# Patient Record
Sex: MALE | Race: WHITE | HISPANIC OR LATINO | Employment: FULL TIME | URBAN - METROPOLITAN AREA
[De-identification: names, ages, dates, MRNs, and addresses within clinical notes are randomized per-mention and may not be internally consistent; named-entity substitution may affect disease eponyms.]

---

## 2023-04-25 ENCOUNTER — TRANSCRIBE ORDERS (OUTPATIENT)
Dept: PREADMISSION TESTING | Facility: HOSPITAL | Age: 63
End: 2023-04-25
Payer: MEDICAID

## 2023-04-26 ENCOUNTER — TRANSCRIBE ORDERS (OUTPATIENT)
Dept: LAB | Facility: HOSPITAL | Age: 63
End: 2023-04-26
Payer: MEDICAID

## 2023-04-26 ENCOUNTER — PRE-ADMISSION TESTING (OUTPATIENT)
Dept: PREADMISSION TESTING | Facility: HOSPITAL | Age: 63
End: 2023-04-26
Payer: MEDICAID

## 2023-04-26 ENCOUNTER — LAB (OUTPATIENT)
Dept: LAB | Facility: HOSPITAL | Age: 63
End: 2023-04-26
Payer: MEDICAID

## 2023-04-26 DIAGNOSIS — Z87.898 PERSONAL HISTORY OF OTHER LYMPHATIC AND HEMATOPOIETIC NEOPLASM: Primary | ICD-10-CM

## 2023-04-26 DIAGNOSIS — Z01.818 OTHER SPECIFIED PRE-OPERATIVE EXAMINATION: ICD-10-CM

## 2023-04-26 DIAGNOSIS — R73.09 IMPAIRED GLUCOSE TOLERANCE TEST: ICD-10-CM

## 2023-04-26 LAB
ALBUMIN SERPL-MCNC: 4.3 G/DL (ref 3.5–5.2)
ALBUMIN/GLOB SERPL: 1.6 G/DL
ALP SERPL-CCNC: 54 U/L (ref 39–117)
ALT SERPL W P-5'-P-CCNC: 23 U/L (ref 1–41)
ANION GAP SERPL CALCULATED.3IONS-SCNC: 11 MMOL/L (ref 5–15)
AST SERPL-CCNC: 20 U/L (ref 1–40)
BILIRUB SERPL-MCNC: 0.2 MG/DL (ref 0–1.2)
BUN SERPL-MCNC: 15 MG/DL (ref 8–23)
BUN/CREAT SERPL: 22.1 (ref 7–25)
CALCIUM SPEC-SCNC: 9 MG/DL (ref 8.6–10.5)
CHLORIDE SERPL-SCNC: 109 MMOL/L (ref 98–107)
CO2 SERPL-SCNC: 22 MMOL/L (ref 22–29)
CREAT SERPL-MCNC: 0.68 MG/DL (ref 0.76–1.27)
DEPRECATED RDW RBC AUTO: 45.6 FL (ref 37–54)
EGFRCR SERPLBLD CKD-EPI 2021: 106.4 ML/MIN/1.73
ERYTHROCYTE [DISTWIDTH] IN BLOOD BY AUTOMATED COUNT: 13 % (ref 12.3–15.4)
GLOBULIN UR ELPH-MCNC: 2.7 GM/DL
GLUCOSE SERPL-MCNC: 100 MG/DL (ref 65–99)
HBA1C MFR BLD: 5.5 % (ref 4.8–5.6)
HCT VFR BLD AUTO: 39.6 % (ref 37.5–51)
HGB BLD-MCNC: 13.1 G/DL (ref 13–17.7)
MCH RBC QN AUTO: 31.5 PG (ref 26.6–33)
MCHC RBC AUTO-ENTMCNC: 33.1 G/DL (ref 31.5–35.7)
MCV RBC AUTO: 95.2 FL (ref 79–97)
PLATELET # BLD AUTO: 200 10*3/MM3 (ref 140–450)
PMV BLD AUTO: 10.9 FL (ref 6–12)
POTASSIUM SERPL-SCNC: 4 MMOL/L (ref 3.5–5.2)
PROT SERPL-MCNC: 7 G/DL (ref 6–8.5)
RBC # BLD AUTO: 4.16 10*6/MM3 (ref 4.14–5.8)
SODIUM SERPL-SCNC: 142 MMOL/L (ref 136–145)
WBC NRBC COR # BLD: 12.63 10*3/MM3 (ref 3.4–10.8)

## 2023-04-26 PROCEDURE — 80053 COMPREHEN METABOLIC PANEL: CPT | Performed by: ORTHOPAEDIC SURGERY

## 2023-04-26 PROCEDURE — 93005 ELECTROCARDIOGRAM TRACING: CPT

## 2023-04-26 PROCEDURE — 36415 COLL VENOUS BLD VENIPUNCTURE: CPT | Performed by: ORTHOPAEDIC SURGERY

## 2023-04-26 PROCEDURE — 83036 HEMOGLOBIN GLYCOSYLATED A1C: CPT | Performed by: ORTHOPAEDIC SURGERY

## 2023-04-26 PROCEDURE — 85027 COMPLETE CBC AUTOMATED: CPT | Performed by: ORTHOPAEDIC SURGERY

## 2023-04-28 LAB
QT INTERVAL: 414 MS
QTC INTERVAL: 416 MS

## 2024-06-03 ENCOUNTER — PRE-ADMISSION TESTING (OUTPATIENT)
Dept: PREADMISSION TESTING | Facility: HOSPITAL | Age: 64
End: 2024-06-03
Payer: COMMERCIAL

## 2024-06-03 VITALS — HEIGHT: 66 IN | WEIGHT: 196.21 LBS | BODY MASS INDEX: 31.53 KG/M2

## 2024-06-03 LAB
ANION GAP SERPL CALCULATED.3IONS-SCNC: 8 MMOL/L (ref 5–15)
BUN SERPL-MCNC: 13 MG/DL (ref 8–23)
BUN/CREAT SERPL: 17.8 (ref 7–25)
CALCIUM SPEC-SCNC: 9 MG/DL (ref 8.6–10.5)
CHLORIDE SERPL-SCNC: 106 MMOL/L (ref 98–107)
CO2 SERPL-SCNC: 27 MMOL/L (ref 22–29)
CREAT SERPL-MCNC: 0.73 MG/DL (ref 0.76–1.27)
DEPRECATED RDW RBC AUTO: 46.2 FL (ref 37–54)
EGFRCR SERPLBLD CKD-EPI 2021: 103.5 ML/MIN/1.73
ERYTHROCYTE [DISTWIDTH] IN BLOOD BY AUTOMATED COUNT: 12.9 % (ref 12.3–15.4)
GLUCOSE SERPL-MCNC: 98 MG/DL (ref 65–99)
HBA1C MFR BLD: 5.5 % (ref 4.8–5.6)
HCT VFR BLD AUTO: 40.9 % (ref 37.5–51)
HGB BLD-MCNC: 13.5 G/DL (ref 13–17.7)
MCH RBC QN AUTO: 32 PG (ref 26.6–33)
MCHC RBC AUTO-ENTMCNC: 33 G/DL (ref 31.5–35.7)
MCV RBC AUTO: 96.9 FL (ref 79–97)
PLATELET # BLD AUTO: 193 10*3/MM3 (ref 140–450)
PMV BLD AUTO: 11.4 FL (ref 6–12)
POTASSIUM SERPL-SCNC: 4.2 MMOL/L (ref 3.5–5.2)
QT INTERVAL: 410 MS
QTC INTERVAL: 402 MS
RBC # BLD AUTO: 4.22 10*6/MM3 (ref 4.14–5.8)
SODIUM SERPL-SCNC: 141 MMOL/L (ref 136–145)
WBC NRBC COR # BLD AUTO: 10.85 10*3/MM3 (ref 3.4–10.8)

## 2024-06-03 PROCEDURE — 93005 ELECTROCARDIOGRAM TRACING: CPT

## 2024-06-03 PROCEDURE — 93010 ELECTROCARDIOGRAM REPORT: CPT | Performed by: INTERNAL MEDICINE

## 2024-06-03 PROCEDURE — 80048 BASIC METABOLIC PNL TOTAL CA: CPT

## 2024-06-03 PROCEDURE — 85027 COMPLETE CBC AUTOMATED: CPT

## 2024-06-03 PROCEDURE — 36415 COLL VENOUS BLD VENIPUNCTURE: CPT

## 2024-06-03 PROCEDURE — 83036 HEMOGLOBIN GLYCOSYLATED A1C: CPT

## 2024-06-03 NOTE — PAT
An arrival time for procedure was not provided during PAT visit. If patient had any questions or concerns about their arrival time, they were instructed to contact their surgeon/physician.  Additionally, if the patient referred to an arrival time that was acquired from their my chart account, patient was encouraged to verify that time with their surgeon/physician. Arrival times are NOT provided in Pre Admission Testing Department.    Patient instructed to drink 20 ounces of Gatorade or Gatorlyte (if diabetic) and it needs to be completed 1 hour (for Main OR patients) or 2 hours (scheduled  section & [facility] patients) before given arrival time for procedure (NO RED Gatorade and NO Gatorade Zero).    Patient verbalized understanding.    Discussed with patient options for receiving total joint replacement education and assessed patient's ability and preference. Joint Replacement Guide given to patient during PAT visit since not received a copy within the last year. Encouraged patient/family to read guide thoroughly and notify PAT staff with any questions or concerns. Handout provided directing patient to links to watch online videos related to joint replacement surgery on the [facility] website. The handout gives detailed instructions for joining an online joint replacement class through Zoom or phone conference offered on . Patient agreed to participate by watching videos online. Patient verbalized understanding of instructions and to complete the online learning tool survey. Encouraged to share information with family and/or . An overview of the joint replacement education was provided during the visit including general perioperative instructions that are routine for all surgical patients (PAT PASS, wipes, directions to pre-op, etc.).    Post-Surgery Information Instruction Sheet given to patient during Pre-Admission Testing Visit with verbal instructions to patient to return with PAT PASS on  the day of surgery. Additionally, encouraged patient to review the information provided.    InfuBLOCK (by [employer]) pain pump patient informational handout given to patient.  Instructed patient to watch InfuBLOCK Patient Education Video regarding Peripheral Nerve Catheter that will be in place for upcoming surgery unless contraindicated. The video can be accessed using QR code noted on handout.  Patient agreed to watch video.  Stressed to patient to call [facility] Nursing Hotline 24/7 if patient has any questions or concerns after discharge.     Patient denies any current skin issues.     Patient to apply Chlorhexadine wipes  to surgical area (as instructed) the night before procedure and the AM of procedure. Wipes provided.    Patient viewed general PAT education video as instructed in their preoperative information received from their surgeon.  Patient stated the general PAT education video was viewed in its entirety and survey completed.  Copies of PAT general education handouts (Incentive Spirometry, Meds to Beds Program, Patient Belongings, Pre-op skin preparation instructions, Blood Glucose testing, Visitor policy, Surgery FAQ, Code H) distributed to patient if not printed. Education related to the PAT pass and skin preparation for surgery (if applicable) completed in PAT as a reinforcement to PAT education video. Patient instructed to return PAT pass provided today as well as completed skin preparation sheet (if applicable) on the day of procedure.     Additionally if patient had not viewed video yet but intended to view it at home or in our waiting area, then referred them to the handout with QR code/link provided during PAT visit.  Instructed patient to complete survey after viewing the video in its entirety.  Encouraged patient/family to read PAT general education handouts thoroughly and notify PAT staff with any questions or concerns. Patient verbalized understanding of all information and priority  content.    PATIENT EKG ON CHART AND IN EPIC FROM 06/03/2024

## 2024-06-05 ENCOUNTER — ANESTHESIA EVENT (OUTPATIENT)
Dept: PERIOP | Facility: HOSPITAL | Age: 64
End: 2024-06-05
Payer: COMMERCIAL

## 2024-06-05 RX ORDER — FAMOTIDINE 10 MG/ML
20 INJECTION, SOLUTION INTRAVENOUS ONCE
Status: CANCELLED | OUTPATIENT
Start: 2024-06-05 | End: 2024-06-05

## 2024-06-05 RX ORDER — SODIUM CHLORIDE 0.9 % (FLUSH) 0.9 %
10 SYRINGE (ML) INJECTION AS NEEDED
Status: CANCELLED | OUTPATIENT
Start: 2024-06-05

## 2024-06-05 RX ORDER — SODIUM CHLORIDE 9 MG/ML
40 INJECTION, SOLUTION INTRAVENOUS AS NEEDED
Status: CANCELLED | OUTPATIENT
Start: 2024-06-05

## 2024-06-06 ENCOUNTER — APPOINTMENT (OUTPATIENT)
Dept: GENERAL RADIOLOGY | Facility: HOSPITAL | Age: 64
End: 2024-06-06
Payer: COMMERCIAL

## 2024-06-06 ENCOUNTER — ANESTHESIA (OUTPATIENT)
Dept: PERIOP | Facility: HOSPITAL | Age: 64
End: 2024-06-06
Payer: COMMERCIAL

## 2024-06-06 ENCOUNTER — ANESTHESIA EVENT CONVERTED (OUTPATIENT)
Dept: ANESTHESIOLOGY | Facility: HOSPITAL | Age: 64
End: 2024-06-06
Payer: COMMERCIAL

## 2024-06-06 ENCOUNTER — HOSPITAL ENCOUNTER (OUTPATIENT)
Facility: HOSPITAL | Age: 64
Discharge: HOME | End: 2024-06-07
Attending: ORTHOPAEDIC SURGERY | Admitting: ORTHOPAEDIC SURGERY
Payer: COMMERCIAL

## 2024-06-06 DIAGNOSIS — Z96.652 STATUS POST LEFT KNEE REPLACEMENT: Primary | ICD-10-CM

## 2024-06-06 PROBLEM — I10 HTN (HYPERTENSION): Status: ACTIVE | Noted: 2024-06-06

## 2024-06-06 PROBLEM — M17.12 PRIMARY LOCALIZED OSTEOARTHRITIS OF LEFT KNEE: Status: ACTIVE | Noted: 2024-06-06

## 2024-06-06 PROBLEM — E66.9 OBESITY (BMI 30-39.9): Status: ACTIVE | Noted: 2024-06-06

## 2024-06-06 LAB — GLUCOSE BLDC GLUCOMTR-MCNC: 95 MG/DL (ref 70–130)

## 2024-06-06 PROCEDURE — 25010000002 DEXAMETHASONE PER 1 MG: Performed by: ANESTHESIOLOGY

## 2024-06-06 PROCEDURE — 25010000002 CEFAZOLIN PER 500 MG: Performed by: ORTHOPAEDIC SURGERY

## 2024-06-06 PROCEDURE — 25810000003 LACTATED RINGERS PER 1000 ML: Performed by: ANESTHESIOLOGY

## 2024-06-06 PROCEDURE — 25810000003 SODIUM CHLORIDE 0.9 % SOLUTION: Performed by: ORTHOPAEDIC SURGERY

## 2024-06-06 PROCEDURE — 25010000002 MEPIVACAINE HCL (PF) 1.5 % SOLUTION: Performed by: ANESTHESIOLOGY

## 2024-06-06 PROCEDURE — 25010000002 FENTANYL CITRATE (PF) 50 MCG/ML SOLUTION

## 2024-06-06 PROCEDURE — 97110 THERAPEUTIC EXERCISES: CPT

## 2024-06-06 PROCEDURE — 25010000002 PROPOFOL 10 MG/ML EMULSION: Performed by: ANESTHESIOLOGY

## 2024-06-06 PROCEDURE — 82948 REAGENT STRIP/BLOOD GLUCOSE: CPT

## 2024-06-06 PROCEDURE — 97162 PT EVAL MOD COMPLEX 30 MIN: CPT

## 2024-06-06 PROCEDURE — C1755 CATHETER, INTRASPINAL: HCPCS | Performed by: ORTHOPAEDIC SURGERY

## 2024-06-06 PROCEDURE — C1713 ANCHOR/SCREW BN/BN,TIS/BN: HCPCS | Performed by: ORTHOPAEDIC SURGERY

## 2024-06-06 PROCEDURE — 73560 X-RAY EXAM OF KNEE 1 OR 2: CPT

## 2024-06-06 PROCEDURE — 97116 GAIT TRAINING THERAPY: CPT

## 2024-06-06 PROCEDURE — 25010000002 ROPIVACAINE HCL-NACL 0.2-0.9 % SOLUTION: Performed by: NURSE ANESTHETIST, CERTIFIED REGISTERED

## 2024-06-06 PROCEDURE — C1776 JOINT DEVICE (IMPLANTABLE): HCPCS | Performed by: ORTHOPAEDIC SURGERY

## 2024-06-06 PROCEDURE — 25010000002 BUPIVACAINE (PF) 0.25 % SOLUTION: Performed by: NURSE ANESTHETIST, CERTIFIED REGISTERED

## 2024-06-06 PROCEDURE — 25010000002 GLYCOPYRROLATE 1 MG/5ML SOLUTION

## 2024-06-06 PROCEDURE — 25010000002 ROPIVACAINE PER 1 MG: Performed by: ORTHOPAEDIC SURGERY

## 2024-06-06 PROCEDURE — 25010000002 MORPHINE PER 10 MG: Performed by: ORTHOPAEDIC SURGERY

## 2024-06-06 PROCEDURE — 25010000002 ONDANSETRON PER 1 MG: Performed by: ANESTHESIOLOGY

## 2024-06-06 PROCEDURE — 25010000002 GLYCOPYRROLATE 1 MG/5ML SOLUTION: Performed by: ANESTHESIOLOGY

## 2024-06-06 DEVICE — LEGION POSTERIOR STABILIZED HIGH                                    FLEX HIGHLY CROSS LINKED                                    POLYETHYLENE SIZE 3-4 9MM
Type: IMPLANTABLE DEVICE | Site: KNEE | Status: FUNCTIONAL
Brand: LEGION

## 2024-06-06 DEVICE — GENESIS II RESURFACING PATELLAR                                    PROSTHESIS  29MM
Type: IMPLANTABLE DEVICE | Site: KNEE | Status: FUNCTIONAL
Brand: GENESIS II

## 2024-06-06 DEVICE — DEV CONTRL TISS STRATAFIX SPIRAL MNCRYL PLS PS 3/0 30CM UD: Type: IMPLANTABLE DEVICE | Site: KNEE | Status: FUNCTIONAL

## 2024-06-06 DEVICE — CMT BONE PALACOS R HI/VISC 1X40: Type: IMPLANTABLE DEVICE | Site: KNEE | Status: FUNCTIONAL

## 2024-06-06 DEVICE — DEV CONTRL TISS STRATAFIX SYMM PDS PLUS VIL CT-1 60CM: Type: IMPLANTABLE DEVICE | Site: KNEE | Status: FUNCTIONAL

## 2024-06-06 DEVICE — IMPLANTABLE DEVICE: Type: IMPLANTABLE DEVICE | Site: KNEE | Status: FUNCTIONAL

## 2024-06-06 DEVICE — LEGION POSTERIOR STABILIZED                                    OXINIUM FEMORAL SIZE 5 LEFT
Type: IMPLANTABLE DEVICE | Site: KNEE | Status: FUNCTIONAL
Brand: LEGION

## 2024-06-06 DEVICE — GENESIS II NON-POROUS TIBIAL                                    BASEPLATE SIZE 4 LEFT
Type: IMPLANTABLE DEVICE | Site: KNEE | Status: FUNCTIONAL
Brand: GENESIS II

## 2024-06-06 RX ORDER — AMOXICILLIN 250 MG
2 CAPSULE ORAL 2 TIMES DAILY PRN
Status: DISCONTINUED | OUTPATIENT
Start: 2024-06-06 | End: 2024-06-07 | Stop reason: HOSPADM

## 2024-06-06 RX ORDER — GLYCOPYRROLATE 0.2 MG/ML
INJECTION INTRAMUSCULAR; INTRAVENOUS
Status: COMPLETED
Start: 2024-06-06 | End: 2024-06-06

## 2024-06-06 RX ORDER — ACETAMINOPHEN 500 MG
1000 TABLET ORAL EVERY 6 HOURS
Status: DISCONTINUED | OUTPATIENT
Start: 2024-06-06 | End: 2024-06-07 | Stop reason: HOSPADM

## 2024-06-06 RX ORDER — PREGABALIN 75 MG/1
75 CAPSULE ORAL ONCE
Status: COMPLETED | OUTPATIENT
Start: 2024-06-06 | End: 2024-06-06

## 2024-06-06 RX ORDER — LABETALOL HYDROCHLORIDE 5 MG/ML
10 INJECTION, SOLUTION INTRAVENOUS EVERY 4 HOURS PRN
Status: DISCONTINUED | OUTPATIENT
Start: 2024-06-06 | End: 2024-06-07 | Stop reason: HOSPADM

## 2024-06-06 RX ORDER — SODIUM CHLORIDE 0.9 % (FLUSH) 0.9 %
10 SYRINGE (ML) INJECTION EVERY 12 HOURS SCHEDULED
Status: DISCONTINUED | OUTPATIENT
Start: 2024-06-06 | End: 2024-06-06 | Stop reason: HOSPADM

## 2024-06-06 RX ORDER — OXYCODONE HYDROCHLORIDE 5 MG/1
5 TABLET ORAL EVERY 4 HOURS PRN
Status: DISCONTINUED | OUTPATIENT
Start: 2024-06-06 | End: 2024-06-07 | Stop reason: HOSPADM

## 2024-06-06 RX ORDER — DEXAMETHASONE SODIUM PHOSPHATE 4 MG/ML
INJECTION, SOLUTION INTRA-ARTICULAR; INTRALESIONAL; INTRAMUSCULAR; INTRAVENOUS; SOFT TISSUE AS NEEDED
Status: DISCONTINUED | OUTPATIENT
Start: 2024-06-06 | End: 2024-06-06 | Stop reason: SURG

## 2024-06-06 RX ORDER — TETRACAINE HYDROCHLORIDE 5 MG/ML
2 SOLUTION OPHTHALMIC ONCE
Status: DISCONTINUED | OUTPATIENT
Start: 2024-06-06 | End: 2024-06-06

## 2024-06-06 RX ORDER — ONDANSETRON 2 MG/ML
INJECTION INTRAMUSCULAR; INTRAVENOUS AS NEEDED
Status: DISCONTINUED | OUTPATIENT
Start: 2024-06-06 | End: 2024-06-06 | Stop reason: SURG

## 2024-06-06 RX ORDER — MAGNESIUM HYDROXIDE 1200 MG/15ML
LIQUID ORAL AS NEEDED
Status: DISCONTINUED | OUTPATIENT
Start: 2024-06-06 | End: 2024-06-06 | Stop reason: HOSPADM

## 2024-06-06 RX ORDER — NALOXONE HCL 0.4 MG/ML
0.1 VIAL (ML) INJECTION
Status: DISCONTINUED | OUTPATIENT
Start: 2024-06-06 | End: 2024-06-07 | Stop reason: HOSPADM

## 2024-06-06 RX ORDER — FENTANYL CITRATE 50 UG/ML
50 INJECTION, SOLUTION INTRAMUSCULAR; INTRAVENOUS
Status: DISCONTINUED | OUTPATIENT
Start: 2024-06-06 | End: 2024-06-06 | Stop reason: HOSPADM

## 2024-06-06 RX ORDER — GLYCOPYRROLATE 0.2 MG/ML
0.4 INJECTION INTRAMUSCULAR; INTRAVENOUS ONCE
Status: COMPLETED | OUTPATIENT
Start: 2024-06-06 | End: 2024-06-06

## 2024-06-06 RX ORDER — TRANEXAMIC ACID 10 MG/ML
1000 INJECTION, SOLUTION INTRAVENOUS ONCE
Status: COMPLETED | OUTPATIENT
Start: 2024-06-06 | End: 2024-06-06

## 2024-06-06 RX ORDER — KETOROLAC TROMETHAMINE 15 MG/ML
15 INJECTION, SOLUTION INTRAMUSCULAR; INTRAVENOUS EVERY 6 HOURS PRN
Status: DISCONTINUED | OUTPATIENT
Start: 2024-06-06 | End: 2024-06-07 | Stop reason: HOSPADM

## 2024-06-06 RX ORDER — MIDAZOLAM HYDROCHLORIDE 1 MG/ML
1 INJECTION INTRAMUSCULAR; INTRAVENOUS
Status: DISCONTINUED | OUTPATIENT
Start: 2024-06-06 | End: 2024-06-06 | Stop reason: HOSPADM

## 2024-06-06 RX ORDER — MELOXICAM 15 MG/1
15 TABLET ORAL ONCE
Status: COMPLETED | OUTPATIENT
Start: 2024-06-06 | End: 2024-06-06

## 2024-06-06 RX ORDER — ONDANSETRON 2 MG/ML
4 INJECTION INTRAMUSCULAR; INTRAVENOUS ONCE AS NEEDED
Status: DISCONTINUED | OUTPATIENT
Start: 2024-06-06 | End: 2024-06-06 | Stop reason: HOSPADM

## 2024-06-06 RX ORDER — ASPIRIN 81 MG/1
81 TABLET ORAL EVERY 12 HOURS SCHEDULED
Status: DISCONTINUED | OUTPATIENT
Start: 2024-06-07 | End: 2024-06-07 | Stop reason: HOSPADM

## 2024-06-06 RX ORDER — DIPHENHYDRAMINE HCL 25 MG
25 CAPSULE ORAL EVERY 6 HOURS PRN
Status: DISCONTINUED | OUTPATIENT
Start: 2024-06-06 | End: 2024-06-07 | Stop reason: HOSPADM

## 2024-06-06 RX ORDER — LIDOCAINE HYDROCHLORIDE 10 MG/ML
INJECTION, SOLUTION EPIDURAL; INFILTRATION; INTRACAUDAL; PERINEURAL AS NEEDED
Status: DISCONTINUED | OUTPATIENT
Start: 2024-06-06 | End: 2024-06-06 | Stop reason: SURG

## 2024-06-06 RX ORDER — MELOXICAM 15 MG/1
15 TABLET ORAL DAILY
Status: DISCONTINUED | OUTPATIENT
Start: 2024-06-07 | End: 2024-06-07 | Stop reason: HOSPADM

## 2024-06-06 RX ORDER — FENTANYL CITRATE 50 UG/ML
INJECTION, SOLUTION INTRAMUSCULAR; INTRAVENOUS
Status: COMPLETED
Start: 2024-06-06 | End: 2024-06-06

## 2024-06-06 RX ORDER — HYDROMORPHONE HYDROCHLORIDE 1 MG/ML
0.5 INJECTION, SOLUTION INTRAMUSCULAR; INTRAVENOUS; SUBCUTANEOUS
Status: DISCONTINUED | OUTPATIENT
Start: 2024-06-06 | End: 2024-06-07 | Stop reason: HOSPADM

## 2024-06-06 RX ORDER — PROPOFOL 10 MG/ML
VIAL (ML) INTRAVENOUS AS NEEDED
Status: DISCONTINUED | OUTPATIENT
Start: 2024-06-06 | End: 2024-06-06 | Stop reason: SURG

## 2024-06-06 RX ORDER — LIDOCAINE HYDROCHLORIDE 10 MG/ML
0.5 INJECTION, SOLUTION EPIDURAL; INFILTRATION; INTRACAUDAL; PERINEURAL ONCE AS NEEDED
Status: COMPLETED | OUTPATIENT
Start: 2024-06-06 | End: 2024-06-06

## 2024-06-06 RX ORDER — BISACODYL 10 MG
10 SUPPOSITORY, RECTAL RECTAL DAILY PRN
Status: DISCONTINUED | OUTPATIENT
Start: 2024-06-06 | End: 2024-06-07 | Stop reason: HOSPADM

## 2024-06-06 RX ORDER — ROPIVACAINE HYDROCHLORIDE 2 MG/ML
INJECTION, SOLUTION EPIDURAL; INFILTRATION; PERINEURAL CONTINUOUS
Status: DISCONTINUED | OUTPATIENT
Start: 2024-06-06 | End: 2024-06-07 | Stop reason: HOSPADM

## 2024-06-06 RX ORDER — OXYCODONE HYDROCHLORIDE 10 MG/1
10 TABLET ORAL EVERY 4 HOURS PRN
Status: DISCONTINUED | OUTPATIENT
Start: 2024-06-06 | End: 2024-06-07 | Stop reason: HOSPADM

## 2024-06-06 RX ORDER — BUPIVACAINE HYDROCHLORIDE 2.5 MG/ML
INJECTION, SOLUTION EPIDURAL; INFILTRATION; INTRACAUDAL
Status: COMPLETED | OUTPATIENT
Start: 2024-06-06 | End: 2024-06-06

## 2024-06-06 RX ORDER — DIPHENHYDRAMINE HYDROCHLORIDE 50 MG/ML
25 INJECTION INTRAMUSCULAR; INTRAVENOUS EVERY 6 HOURS PRN
Status: DISCONTINUED | OUTPATIENT
Start: 2024-06-06 | End: 2024-06-07 | Stop reason: HOSPADM

## 2024-06-06 RX ORDER — SODIUM CHLORIDE, SODIUM LACTATE, POTASSIUM CHLORIDE, CALCIUM CHLORIDE 600; 310; 30; 20 MG/100ML; MG/100ML; MG/100ML; MG/100ML
9 INJECTION, SOLUTION INTRAVENOUS CONTINUOUS
Status: DISCONTINUED | OUTPATIENT
Start: 2024-06-06 | End: 2024-06-07 | Stop reason: HOSPADM

## 2024-06-06 RX ORDER — SODIUM CHLORIDE 9 MG/ML
100 INJECTION, SOLUTION INTRAVENOUS CONTINUOUS
Status: DISCONTINUED | OUTPATIENT
Start: 2024-06-06 | End: 2024-06-07 | Stop reason: HOSPADM

## 2024-06-06 RX ORDER — BISACODYL 5 MG/1
10 TABLET, DELAYED RELEASE ORAL DAILY PRN
Status: DISCONTINUED | OUTPATIENT
Start: 2024-06-06 | End: 2024-06-07 | Stop reason: HOSPADM

## 2024-06-06 RX ORDER — ACETAMINOPHEN 500 MG
1000 TABLET ORAL ONCE
Status: COMPLETED | OUTPATIENT
Start: 2024-06-06 | End: 2024-06-06

## 2024-06-06 RX ORDER — ONDANSETRON 2 MG/ML
4 INJECTION INTRAMUSCULAR; INTRAVENOUS EVERY 6 HOURS PRN
Status: DISCONTINUED | OUTPATIENT
Start: 2024-06-06 | End: 2024-06-07 | Stop reason: HOSPADM

## 2024-06-06 RX ORDER — ONDANSETRON 4 MG/1
4 TABLET, ORALLY DISINTEGRATING ORAL EVERY 6 HOURS PRN
Status: DISCONTINUED | OUTPATIENT
Start: 2024-06-06 | End: 2024-06-07 | Stop reason: HOSPADM

## 2024-06-06 RX ORDER — GLYCOPYRROLATE 0.2 MG/ML
0.2 INJECTION INTRAMUSCULAR; INTRAVENOUS ONCE
Status: COMPLETED | OUTPATIENT
Start: 2024-06-06 | End: 2024-06-06

## 2024-06-06 RX ORDER — LISINOPRIL 20 MG/1
20 TABLET ORAL DAILY
Status: DISCONTINUED | OUTPATIENT
Start: 2024-06-06 | End: 2024-06-07 | Stop reason: HOSPADM

## 2024-06-06 RX ORDER — FAMOTIDINE 20 MG/1
20 TABLET, FILM COATED ORAL ONCE
Status: COMPLETED | OUTPATIENT
Start: 2024-06-06 | End: 2024-06-06

## 2024-06-06 RX ORDER — DOCUSATE SODIUM 100 MG/1
100 CAPSULE, LIQUID FILLED ORAL 2 TIMES DAILY PRN
Status: DISCONTINUED | OUTPATIENT
Start: 2024-06-06 | End: 2024-06-07 | Stop reason: HOSPADM

## 2024-06-06 RX ADMIN — PREGABALIN 75 MG: 75 CAPSULE ORAL at 08:29

## 2024-06-06 RX ADMIN — FAMOTIDINE 20 MG: 20 TABLET, FILM COATED ORAL at 08:29

## 2024-06-06 RX ADMIN — ONDANSETRON 4 MG: 2 INJECTION INTRAMUSCULAR; INTRAVENOUS at 10:17

## 2024-06-06 RX ADMIN — BUPIVACAINE HYDROCHLORIDE 20 ML: 2.5 INJECTION, SOLUTION EPIDURAL; INFILTRATION; INTRACAUDAL; PERINEURAL at 11:49

## 2024-06-06 RX ADMIN — LISINOPRIL 20 MG: 20 TABLET ORAL at 20:03

## 2024-06-06 RX ADMIN — SODIUM CHLORIDE 2 G: 900 INJECTION INTRAVENOUS at 19:59

## 2024-06-06 RX ADMIN — FENTANYL CITRATE 50 MCG: 50 INJECTION, SOLUTION INTRAMUSCULAR; INTRAVENOUS at 13:53

## 2024-06-06 RX ADMIN — PROPOFOL 50 MG: 10 INJECTION, EMULSION INTRAVENOUS at 10:27

## 2024-06-06 RX ADMIN — PROPOFOL 50 MCG/KG/MIN: 10 INJECTION, EMULSION INTRAVENOUS at 10:03

## 2024-06-06 RX ADMIN — LIDOCAINE HYDROCHLORIDE 25 MG: 10 INJECTION, SOLUTION EPIDURAL; INFILTRATION; INTRACAUDAL; PERINEURAL at 10:03

## 2024-06-06 RX ADMIN — MEPIVACAINE HYDROCHLORIDE 4 ML: 15 INJECTION, SOLUTION EPIDURAL; INFILTRATION at 10:01

## 2024-06-06 RX ADMIN — ACETAMINOPHEN 1000 MG: 500 TABLET ORAL at 19:59

## 2024-06-06 RX ADMIN — LIDOCAINE HYDROCHLORIDE 25 MG: 10 INJECTION, SOLUTION EPIDURAL; INFILTRATION; INTRACAUDAL; PERINEURAL at 09:58

## 2024-06-06 RX ADMIN — TRANEXAMIC ACID 1000 MG: 10 INJECTION, SOLUTION INTRAVENOUS at 11:15

## 2024-06-06 RX ADMIN — GLYCOPYRROLATE 0.4 MG: 0.2 INJECTION INTRAMUSCULAR; INTRAVENOUS at 13:17

## 2024-06-06 RX ADMIN — MELOXICAM 15 MG: 15 TABLET ORAL at 08:29

## 2024-06-06 RX ADMIN — SODIUM CHLORIDE 2000 MG: 900 INJECTION INTRAVENOUS at 09:58

## 2024-06-06 RX ADMIN — TRANEXAMIC ACID 1000 MG: 10 INJECTION, SOLUTION INTRAVENOUS at 10:03

## 2024-06-06 RX ADMIN — GLYCOPYRROLATE 0.2 MG: 0.2 INJECTION INTRAMUSCULAR; INTRAVENOUS at 13:11

## 2024-06-06 RX ADMIN — Medication 1000 MG: at 12:05

## 2024-06-06 RX ADMIN — DEXAMETHASONE SODIUM PHOSPHATE 8 MG: 4 INJECTION INTRA-ARTICULAR; INTRALESIONAL; INTRAMUSCULAR; INTRAVENOUS; SOFT TISSUE at 10:17

## 2024-06-06 RX ADMIN — SODIUM CHLORIDE 100 ML/HR: 9 INJECTION, SOLUTION INTRAVENOUS at 20:03

## 2024-06-06 RX ADMIN — SODIUM CHLORIDE, POTASSIUM CHLORIDE, SODIUM LACTATE AND CALCIUM CHLORIDE: 600; 310; 30; 20 INJECTION, SOLUTION INTRAVENOUS at 10:53

## 2024-06-06 RX ADMIN — LIDOCAINE HYDROCHLORIDE 0.5 ML: 10 INJECTION, SOLUTION EPIDURAL; INFILTRATION; INTRACAUDAL; PERINEURAL at 08:29

## 2024-06-06 RX ADMIN — SODIUM CHLORIDE, POTASSIUM CHLORIDE, SODIUM LACTATE AND CALCIUM CHLORIDE 9 ML/HR: 600; 310; 30; 20 INJECTION, SOLUTION INTRAVENOUS at 08:29

## 2024-06-06 RX ADMIN — PROPOFOL 25 MG: 10 INJECTION, EMULSION INTRAVENOUS at 09:58

## 2024-06-06 RX ADMIN — ACETAMINOPHEN 1000 MG: 500 TABLET ORAL at 08:29

## 2024-06-06 RX ADMIN — PROPOFOL 25 MG: 10 INJECTION, EMULSION INTRAVENOUS at 10:08

## 2024-06-06 NOTE — H&P
Patient Name: Diego Carlos  MRN: [id number]  : 1960  DOS: 2024    Attending: Bhavin Tillman,*    Primary Care Provider: Provider, No Known      Chief complaint: Left knee Pain.    Subjective   Patient is a pleasant 63 y.o. male presented for scheduled surgery by Dr. Tillman.    Per his note (Diego is a very pleasant 63-year-old male who has struggled with end-stage activity limiting left knee pain secondary to osteoarthritis.  X-rays in January revealed severe tricompartmental degenerative changes in a varus knee with complete loss of medial joint space and marginal osteophyte formation.  They have failed exhaustive conservative treatment measures including cortisone injections and physical therapy.  History of a left knee scope with partial medial meniscectomy May 2023 for work injury.  After undergoing a shared decision-making process they agreed to proceed with left total knee arthroplasty.  Surgical consent form was signed.).    Patient underwent left total knee arthroplasty under spinal anesthesia, tolerated surgery well, adductor canal nerve block catheter was placed back pain service, has been admitted for further management.    Seen in PACU postop, doing well, good pain control.  He has ambulated with PT.  No nausea, vomiting, or shortness of breath.    He was noted to have sinus bradycardia into the 40s and briefly into the 30s.  This has since recovered and when I saw him he is in sinus rhythm with a rate close to 70.    He has no history of DVT or PE.    Allergies:  No Known Allergies    Meds:  Medications Prior to Admission   Medication Sig Dispense Refill Last Dose    lisinopril (PRINIVIL,ZESTRIL) 20 MG tablet Take 1 tablet by mouth Daily.   2024         History:   Past Medical History:   Diagnosis Date    Hypertension      Past Surgical History:   Procedure Laterality Date    KNEE ARTHROSCOPY Left      History reviewed. No pertinent family history.  Social History     Tobacco  "Use    Smoking status: Never    Smokeless tobacco: Never   Vaping Use    Vaping status: Never Used   Substance Use Topics    Alcohol use: Yes     Comment: 1 12 PACK OF BEER WEEKLY    Drug use: Never       Review of Systems  Pertinent items are noted in HPI    Vital Signs  /79   Pulse 59   Temp 98 °F (36.7 °C) (Temporal)   Resp 16   Ht 166.4 cm (65.5\")   Wt 89 kg (196 lb 3.4 oz)   SpO2 92%   BMI 32.15 kg/m²     Physical Exam:    General Appearance:    Alert, cooperative, in no acute distress   Head:    Normocephalic, without obvious abnormality, atraumatic   Eyes:            Lids and lashes normal, conjunctivae and sclerae normal, no   icterus, no pallor, corneas clear    Ears:    Ears appear intact with no abnormalities noted   Throat:   No oral lesions, no thrush, oral mucosa moist   Neck:   No adenopathy, supple, trachea midline, no thyromegaly         Lungs:     Clear to auscultation,respirations regular, even and                   unlabored    Heart:    Regular rhythm and normal rate, normal S1 and S2, no       murmur, no gallop   Abdomen:     Normal bowel sounds, no masses, no organomegaly, soft        non-tender, non-distended, no guarding, no rebound                 tenderness   Genitalia:    Deferred   Extremities: Left LE, CDI dressing on knee, PNB cath present.    Pulses:   Pulses palpable and equal bilaterally   Skin:   No bleeding, bruising or rash   Neurologic:   Cranial nerves 2 - 12 grossly intact, intact flexion and dorsiflexion bilateral feet      I reviewed the patient's new clinical results.       Results from last 7 days   Lab Units 06/03/24  1414   WBC 10*3/mm3 10.85*   HEMOGLOBIN g/dL 13.5   HEMATOCRIT % 40.9   PLATELETS 10*3/mm3 193     Results from last 7 days   Lab Units 06/03/24  1414   SODIUM mmol/L 141   POTASSIUM mmol/L 4.2   CHLORIDE mmol/L 106   CO2 mmol/L 27.0   BUN mg/dL 13   CREATININE mg/dL 0.73*   CALCIUM mg/dL 9.0   GLUCOSE mg/dL 98     Lab Results   Component " Value Date    HGBA1C 5.50 06/03/2024           Assessment and Plan:       Status post left knee replacement    Primary localized osteoarthritis of left knee    HTN (hypertension)    Obesity (BMI 30-39.9)  Postop sinus bradycardia episode, likely vagal response    Plan  1. PT/OT,  Weight bearing as tolerated left LE  2. Pain control-prns, ACB cath with ropivacaine infusion.  3. IS-encourage  4. DVT proph- Mechanicals and aspirin  5. Bowel regimen  6. Resume home medications as appropriate  7. Monitor post-op labs  8. DC planning for home    - Hypertension:  Resume home medications as appropriate, formulary substitution when indicated.  Holding parameters.  Prn medications for elevated blood pressure.          Dragon disclaimer:  Part of this encounter note is an electronic transcription/translation of spoken language to printed text. The electronic translation of spoken language may permit erroneous, or at times, nonsensical words or phrases to be inadvertently transcribed; Although I have reviewed the note for such errors, some may still exist.    Mely Marti MD  06/06/24  18:12 EDT

## 2024-06-06 NOTE — ANESTHESIA PROCEDURE NOTES
Peripheral Block      Patient reassessed immediately prior to procedure    Reason for block: at surgeon's request and post-op pain management  Performed byRUY: Aamir Mckeon SRNA  Preanesthetic Checklist  Completed: patient identified, IV checked, site marked, risks and benefits discussed, surgical consent, monitors and equipment checked, pre-op evaluation and timeout performed  Prep:  Pt Position: supine  Sterile barriers:cap, gloves, mask, sterile barriers and washed/disinfected hands  Prep: ChloraPrep  Patient monitoring: blood pressure monitoring, continuous pulse oximetry and EKG  Procedure  Performed under: spinal  Guidance:ultrasound guided    ULTRASOUND INTERPRETATION.  Using ultrasound guidance a 20 G gauge needle was placed in close proximity to the nerve, at which point, under ultrasound guidance anesthetic was injected in the area of the nerve and spread of the anesthesia was seen on ultrasound in close proximity thereto.  There were no abnormalities seen on ultrasound; a digital image was taken; and the patient tolerated the procedure with no complications. Images:still images obtained, printed/placed on chart    Block Type:adductor canal block  Injection Technique:catheter  Needle Type:Tuohy and echogenic  Needle Gauge:18 G  Resistance on Injection: none  Catheter Size:20 G (20g)          Post Assessment  Injection Assessment: negative aspiration for heme, incremental injection and no paresthesia on injection  Patient Tolerance:comfortable throughout block  Complications:no  Additional Notes  {[email or web address]:[id number]}

## 2024-06-06 NOTE — OP NOTE
Preoperative diagnosis:Left knee end stage osteoarthritis    Postoperative diagnosis: Left knee end stage osteoarthritis    Operative procedure: Left total knee arthroplasty with Cori robot computer guidance    Surgeon: Dr. Jose Juan Tillman    Assistant: KENDAL Urena.  Necessary during the case for assistance with positioning of the patient, exposure, implantation of components and closure.  Necessary for the success and completion of the case.    Anesthesia: Spinal with local and adductor canal catheter    Estimated blood loss: Minimal    Surgical Approach: Knee Medial Parapatellar     Implants: [employer] posterior stabilized total knee arthroplasty size 5 femur, 4 tibia, 29 patella, 9 polyethylene.    Tourniquet time: 81 minutes at 300 mmHg    Indications for procedure: Diego is a very pleasant 63-year-old male who has struggled with end-stage activity limiting left knee pain secondary to osteoarthritis.  X-rays in January revealed severe tricompartmental degenerative changes in a varus knee with complete loss of medial joint space and marginal osteophyte formation.  They have failed exhaustive conservative treatment measures including cortisone injections and physical therapy.  History of a left knee scope with partial medial meniscectomy May 2023 for work injury.  After undergoing a shared decision-making process they agreed to proceed with left total knee arthroplasty.  Surgical consent form was signed.    Description of procedure: The patient was seen in the preoperative holding area and the left knee was signed to confirm the correct operative site.  They were seen by anesthesia.  They received Ancef 2 g IV prophylactic antibiotics within 1 hour of incision time.  They were brought back to the operating room.  Spinal was performed without difficulty and they were given sedation throughout the case.  Patient placed in the supine position and all bony prominences were well-padded.  Nonsterile tourniquet  was applied to the thigh.  The left lower extremity was prepped and draped in the usual sterile fashion and timeout was performed to confirm left total knee arthroplasty for patient Diego Richardson.    The left lower extremity was exsanguinated with an Esmarch.  Tourniquet was inflated to 300 mmHg.  With the knee flexed a midline incision was made with a 10 blade scalpel.  Adequate hemostasis maintained with Bovie electrocautery.  Full-thickness medial and lateral flaps were elevated.  Using a fresh 10 blade scalpel I made a medial parapatellar arthrotomy.  The patella was everted.  Patella fat pad and anterior femoral fat pads were excised.  Marginal osteophytes were removed.  Medial release was performed for this varus knee using Bovie electrocautery.  Z retractors were placed medially and laterally.    Guide pins for the CORI were placed in the medial tibia shaft and distal medial femur. Sensors were placed and data capture was performed per standard CORI computer guidance technique.  Femoral and tibial sizes were determined.    The distal femoral cut was then made with a hermelindo at the previously selected depth and amount of valgus (3-5 degrees). The 4-in-1 cutting guide for the previously selected femur size of 5 was pinned in place at the appropriate amount of external rotation.  Anterior and posterior cuts were made as were the chamfer cuts.  Cut bone was removed.  We then turned our attention to the tibia.    The tibia was subluxed anteriorly. PCL retractor was placed as were medial and lateral Hohmann retractors.  The tibial cutting guide was then pinned in place using CORI guidance for the proximal tibial cut. We then used the oscillating saw to make the proximal tibial cut and this cut was then checked with the CORI. Medial and lateral menisci were then excised as were posterior osteophytes.    Flexion and extension gaps were then checked and with a 9 mm block we achieved full extension and flexion with excellent  alignment. The tibia was sized to a 4 tibial tray centered off the medial third of the tibial tubercle.  The tray was pinned in place.  We then impacted the tibial fins.  Size 5  trial femur was then placed and box cut was made with the reamer and punch. We trialed with a size 9 polyethylene, 5 femur and 4 tibia.  With these trial components in place we achieved full extension and flexion with excellent alignment and stable throughout.     We then turned our attention to the patella.  Patella was sized to 24 mm in thickness and we made a 9 mm patellar cut with the reciprocal saw.  Patella drill holes were made with the appropriate size guide.  A 29 trial button was placed and there was excellent tracking with the no thumbs technique.    Trial components were then removed.  Final components were opened on the back table.  Posterior capsule was injected with 20 mL of half percent ropivacaine and 5 mg of morphine.  Cement was mixed on the back table.  The knee was copiously irrigated with Pulsavac lavage.  The knee was thoroughly dried.  Cement was then applied to the tibia and final size 4 tibial tray was impacted in place and excess cement was removed.  Cement was applied to the femur and final size 5 femur was impacted in place and excess cement removed.  We then placed a 9 mm polyethylene-this was seen to be fully seated in the tibial tray.  Knee was then placed in extension and we applied cement to the cut patellar surface and 29 patella was held in place with patellar clamp.  All excess cement was removed.  The knee was left in extension while cement cured.  While the cement cured we removed the CORI tibial and femoral pins and sensors.  Tibial pin incisions were closed with 3-0 monocryl.    Once the cement was fully cured we irrigated the knee with diluted Betadine solution and Pulsavac lavage.  The knee was taken through full range of motion and seen to achieve full extension and flexion with excellent patellar  tracking.    We then proceeded with closure.  The deep fascia was closed with a #1 Stratafix barbed suture.  Dermis was closed with 2-0 Vicryl.  Skin was closed with a running 3-0 Stratafix barbed subcuticular suture.    A sterile dressing was then applied with mesh dressing and glue.  We then applied 4x4's, ABDs, soft roll and a six-inch Ace bandage.    Tourniquet was deflated at 81 minutes.  Patient was transferred to recovery in stable condition.  All sponge and needle counts were correct ×2.  Patient received first dose of tranexamic acid just prior to incision and the second dose 5-10 minutes prior to letting down the tourniquet to minimize bleeding.    Postoperative plan:  Patient will be admitted to Dr. AGUILAR for medical management  Mobilize starting today with PT/OT as tolerated  Start aspirin for DVT prophylaxis tomorrow   consult for home physical therapy and home equipment needs  Follow-up with me in 2-3 weeks.    Bhavin Tillman MD  06/06/24  12:04 EDT

## 2024-06-06 NOTE — ANESTHESIA PREPROCEDURE EVALUATION
Anesthesia Evaluation     Patient summary reviewed and Nursing notes reviewed   no history of anesthetic complications:   NPO Solid Status: > 8 hours  NPO Liquid Status: > 2 hours           Airway   Mallampati: II  TM distance: >3 FB  Neck ROM: full  No difficulty expected  Dental    (+) upper dentures    Pulmonary - negative pulmonary ROS and normal exam   Cardiovascular - normal exam    ECG reviewed    (+) hypertension  (-) angina, HENDRICKSON      Neuro/Psych- negative ROS  GI/Hepatic/Renal/Endo - negative ROS     Musculoskeletal     Abdominal    Substance History      OB/GYN          Other   arthritis,                 Anesthesia Plan    ASA 2     spinal     (Post op block)  intravenous induction     Anesthetic plan, risks, benefits, and alternatives have been provided, discussed and informed consent has been obtained with: patient.    Plan discussed with CRNA.    CODE STATUS:

## 2024-06-06 NOTE — THERAPY EVALUATION
Patient Name: Diego Carlos  : 1960    MRN: [id number]                              Today's Date: 2024       Admit Date: 2024    Visit Dx: No diagnosis found.  Patient Active Problem List   Diagnosis    Primary localized osteoarthritis of left knee    Status post left knee replacement    HTN (hypertension)    Obesity (BMI 30-39.9)     Past Medical History:   Diagnosis Date    Hypertension      Past Surgical History:   Procedure Laterality Date    KNEE ARTHROSCOPY Left       General Information       Row Name 24          Physical Therapy Time and Intention    Document Type evaluation  -SC     Mode of Treatment physical therapy  -SC       Row Name 24          General Information    Patient Profile Reviewed yes  -SC     Prior Level of Function independent:;gait;transfer;all household mobility  Khmer speaking  used on computer # [id number], Sharyn Holm  -SC     Existing Precautions/Restrictions brace on at all times;other (see comments)  nerve cath  -SC     Barriers to Rehab none identified  -SC       Row Name 24          Living Environment    People in Home child(arvin), adult  -SC       Row Name 24          Home Main Entrance    Number of Stairs, Main Entrance none  -SC       Row Name 24 182          Stairs Within Home, Primary    Number of Stairs, Within Home, Primary none  -SC       Row Name 24          Cognition    Orientation Status (Cognition) oriented x 3  -SC       Row Name 24          Safety Issues, Functional Mobility    Impairments Affecting Function (Mobility) pain;strength;range of motion (ROM)  -SC     Comment, Safety Issues/Impairments (Mobility) alert, following commands  -SC               User Key  (r) = Recorded By, (t) = Taken By, (c) = Cosigned By      Initials Name Provider Type    SC Trice Mabry PT Physical Therapist                   Mobility       Row Name 24          Bed Mobility     Bed Mobility supine-sit;scooting/bridging  -SC     Scooting/Bridging [place] (Bed Mobility) modified independence  -SC     Supine-Sit [place] (Bed Mobility) 1 person assist;minimum assist (75% patient effort);verbal cues  -SC     Assistive Device (Bed Mobility) head of bed elevated;bed rails  -SC     Comment, (Bed Mobility) required some assist to move L leg off edge of bed  -SC       Row Name 06/06/24 1826          Transfers    Comment, (Transfers) Cues for safe transfer and hand placement  -SC       Row Name 06/06/24 1826          Sit-Stand Transfer    Sit-Stand [place] (Transfers) 1 person assist;1 person to manage equipment;contact guard;verbal cues  -SC     Assistive Device (Sit-Stand Transfers) walker, front-wheeled  -[facility] Name 06/06/24 1826          Gait/Stairs (Locomotion)    [place] Level (Gait) 1 person to manage equipment;1 person assist;contact guard;verbal cues  -SC     Assistive Device (Gait) walker, front-wheeled  -SC     Patient was able to Ambulate yes  -SC     Distance in Feet (Gait) 160  -SC     Deviations/Abnormal Patterns (Gait) left sided deviations;antalgic;gait speed decreased;stride length decreased;weight shifting decreased  -SC     Bilateral Gait Deviations forward flexed posture  -SC     Comment, (Gait/Stairs) Gt training focused on achieving step through pattern. This took some time and cues to achieve due to painful post knee. Brace on for ambulation. No LOB noted. c/o neck pain  -SC       Row Name 06/06/24 1826          Mobility    Extremity Weight-bearing Status left lower extremity  -SC     Left Lower Extremity (Weight-bearing Status) weight-bearing as tolerated (WBAT)  -SC               User Key  (r) = Recorded By, (t) = Taken By, (c) = Cosigned By      Initials Name Provider Type    SC Trice Mabry PT Physical Therapist                   Obj/Interventions       [facility] Name 06/06/24 1831          Range of Motion Comprehensive    General Range of Motion  lower extremity range of motion deficits identified  -SC     Comment, General Range of Motion L knee impaired 50% due to pain  -[facility] Name 06/06/24 1831          Strength Comprehensive (MMT)    General Manual Muscle Testing (MMT) Assessment lower extremity strength deficits identified  -SC     Comment, General Manual Muscle Testing (MMT) Assessment L LE tib ant 4/5, quads -3/5 R LE grossly 4+.5  -[facility] Name 06/06/24 1831          Motor Skills    Therapeutic Exercise ankle;knee  -SC       Row Name 06/06/24 1831          Knee (Therapeutic Exercise)    Knee (Therapeutic Exercise) AAROM (active assistive range of motion);isometric exercises;strengthening exercise  -SC     Knee AAROM (Therapeutic Exercise) left;flexion;extension;supine;5 repetitions  -SC     Knee Isometrics (Therapeutic Exercise) left;quad sets;10 repetitions  -SC     Knee Strengthening (Therapeutic Exercise) --   unable to preform SLR  -[facility] Name 06/06/24 1831          Ankle (Therapeutic Exercise)    Ankle (Therapeutic Exercise) AROM (active range of motion)  -SC     Ankle AROM (Therapeutic Exercise) bilateral;dorsiflexion;plantarflexion;10 repetitions  -SC       Row Name 06/06/24 1831          Balance    Balance Assessment standing dynamic balance  -SC     Dynamic Standing Balance 1-person assist;1 person to manage equipment;contact guard  -SC     Position/Device Used, Standing Balance supported;walker, rolling  -SC     Comment, Balance no LOB  -SC       Row Name 06/06/24 1831          Sensory Assessment (Somatosensory)    Sensory Assessment (Somatosensory) other (see comments)  L dorsal foot + numbness  -SC               User Key  (r) = Recorded By, (t) = Taken By, (c) = Cosigned By      Initials Name Provider Type    SC Trice Mabry, PT Physical Therapist                   Goals/Plan       Row Name 06/06/24 1836          Bed Mobility Goal 1 (PT)    Activity/Assistive Device (Bed Mobility Goal 1, PT) bed mobility activities,  all  -SC     [place] Level/Cues Needed (Bed Mobility Goal 1, PT) modified independence  -SC     Time Frame (Bed Mobility Goal 1, PT) long term goal (LTG);10 days  -SC       Row Name 06/06/24 1836          Transfer Goal 1 (PT)    Activity/Assistive Device (Transfer Goal 1, PT) sit-to-stand/stand-to-sit;bed-to-chair/chair-to-bed  -SC     [place] Level/Cues Needed (Transfer Goal 1, PT) contact guard required  -SC     Time Frame (Transfer Goal 1, PT) long term goal (LTG);10 days  -SC       Row Name 06/06/24 1836          Gait Training Goal 1 (PT)    Activity/Assistive Device (Gait Training Goal 1, PT) gait (walking locomotion);walker, rolling  -SC     [place] Level (Gait Training Goal 1, PT) contact guard required  -SC     Distance (Gait Training Goal 1, PT) 350  -SC     Time Frame (Gait Training Goal 1, PT) long term goal (LTG);10 days  -[facility] Name 06/06/24 1836          ROM Goal 1 (PT)    ROM Goal 1 (PT) 10-70 deg L knee  -SC     Time Frame (ROM Goal 1, PT) short-term goal (STG);2 days  -SC       Row Name 06/06/24 1836          Therapy Assessment/Plan (PT)    Planned Therapy Interventions (PT) bed mobility training;gait training;home exercise program;patient/family education;ROM (range of motion);transfer training  -SC               User Key  (r) = Recorded By, (t) = Taken By, (c) = Cosigned By      Initials Name Provider Type    SC Trice Mabry, PT Physical Therapist                   Clinical Impression       Row Name 06/06/24 1833          Pain    Additional Documentation Pain Scale: FACES Pre/Post-Treatment (Group)  -[facility] Name 06/06/24 1833          Pain Scale: FACES Pre/Post-Treatment    Pain: FACES Scale, Pretreatment 6-->hurts even more  -SC     Posttreatment Pain Rating 6-->hurts even more  -SC     Pain Location - Side/Orientation Left  -SC     Pain Location posterior  -SC     Pain Location - knee  -SC       Row Name 06/06/24 1833          Plan of Care Review    Plan of Care  Reviewed With patient  -SC     Progress improving  -SC     Outcome Evaluation Patient required a knee immobiliizer to ambulate in hallway. Rn noted a dip in HR at end of ambulaton. Recommend continued KI untill patient able to preform SLR. He will be going home with home health.  -SC       Row Name 06/06/24 1833          Therapy Assessment/Plan (PT)    Patient/Family Therapy Goals Statement (PT) walk  -SC     Rehab Potential (PT) good, to achieve stated therapy goals  -SC     Criteria for Skilled Interventions Met (PT) yes;meets criteria  -SC     Therapy Frequency (PT) 2 times/day  -SC     Predicted Duration of Therapy Intervention (PT) 2  -SC       Row Name 06/06/24 1833          Vital Signs    Pretreatment Heart Rate (beats/min) 62  -SC     Intratreatment Heart Rate (beats/min) 42  NSR per RN  -SC     Posttreatment Heart Rate (beats/min) 54  -[facility] Name 06/06/24 1833          Positioning and Restraints    Pre-Treatment Position in bed  -SC     Post Treatment Position chair  -SC     In Chair notified nsg;reclined;sitting;call light within reach;encouraged to call for assist;exit alarm on;with family/caregiver  -SC               User Key  (r) = Recorded By, (t) = Taken By, (c) = Cosigned By      Initials Name Provider Type    SC Trice Mabry, PT Physical Therapist                   Outcome Measures       Row Name 06/06/24 1837          How much help from another person do you currently need...    Turning from your back to your side while in flat bed without using bedrails? 4  -SC     Moving from lying on back to sitting on the side of a flat bed without bedrails? 3  -SC     Moving to and from a bed to a chair (including a wheelchair)? 3  -SC     Standing up from a chair using your arms (e.g., wheelchair, bedside chair)? 3  -SC     Climbing 3-5 steps with a railing? 3  -SC     To walk in hospital room? 3  -SC     AM-PAC 6 Clicks Score (PT) 19  -SC     Highest Level of Mobility Goal 6 --> Walk 10 steps or  more  -SC       Row Name 06/06/24 1837          PADD    Diagnosis 1  -SC     Gender 2  -SC     Age Group 2  -SC     Gait Distance 1  -SC     Assist Level 1  -SC     Home Support 3  -SC     PADD Score 10  -SC     Prediction by PADD Score directly home (with home health or out-patient rehab)  -SC       Row Name 06/06/24 1837          Functional Assessment    Outcome Measure Options AM-PAC 6 Clicks Basic Mobility (PT);PADD  -SC               User Key  (r) = Recorded By, (t) = Taken By, (c) = Cosigned By      Initials Name Provider Type    SC Trice Mabry PT Physical Therapist                                 Physical Therapy Education       Title: PT OT SLP Therapies (Done)       Topic: Physical Therapy (Done)       Point: Mobility training (Done)       Learning Progress Summary             Patient CORY Demarco VU by SC at 6/6/2024 1838    Comment: reviewed benefits of activity and use of brace                         Point: Home exercise program (Done)       Learning Progress Summary             Patient CORY Demarco VU by SC at 6/6/2024 1838    Comment: reviewed benefits of activity and use of brace                         Point: Body mechanics (Done)       Learning Progress Summary             Patient CORY Demarco VU by SC at 6/6/2024 1838    Comment: reviewed benefits of activity and use of brace                         Point: Precautions (Done)       Learning Progress Summary             Patient CORY Demarco VU by SC at 6/6/2024 1838    Comment: reviewed benefits of activity and use of brace                                         User Key       Initials Effective Dates Name Provider Type [facility] 02/03/23 -  Trice Mabry PT Physical Therapist PT                  PT Recommendation and Plan  Planned Therapy Interventions (PT): bed mobility training, gait training, home exercise program, patient/family education, ROM (range of motion), transfer training  Plan of Care Reviewed With: patient  Progress:  improving  Outcome Evaluation: Patient required a knee immobiliizer to ambulate in hallway. Rn noted a dip in HR at end of ambulaton. Recommend continued KI untill patient able to preform SLR. He will be going home with home health.     Time Calculation:   PT Evaluation Complexity  History, PT Evaluation Complexity: 3 or more personal factors and/or comorbidities  Examination of Body Systems (PT Eval Complexity): total of 4 or more elements  Clinical Presentation (PT Evaluation Complexity): evolving  Clinical Decision Making (PT Evaluation Complexity): moderate complexity  Overall Complexity (PT Evaluation Complexity): moderate complexity     PT Charges       Row Name 06/06/24 1743             Time Calculation    Start Time 1743  -SC      PT Received On 06/06/24  -SC      PT Goal Re-Cert Due Date 06/16/24  -SC         Timed Charges    01753 - PT Therapeutic Exercise Minutes 10  -SC      22630 - Gait Training Minutes  15  -SC      38957 - PT Therapeutic Activity Minutes 5  -SC         Untimed Charges    PT Eval/Re-eval Minutes 30  -SC         Total Minutes    Timed Charges Total Minutes 30  -SC      Untimed Charges Total Minutes 30  -SC       Total Minutes 60  -SC                User Key  (r) = Recorded By, (t) = Taken By, (c) = Cosigned By      Initials Name Provider Type    SC Trice Mabry, PT Physical Therapist                  Therapy Charges for Today       Code Description Service Date Service Provider Modifiers Qty    62348863365 HC PT THER PROC EA 15 MIN 6/6/2024 Trice Mabry, PT GP 1    32741378858 HC GAIT TRAINING EA 15 MIN 6/6/2024 Trice Mabry, PT GP 1    55029057907 HC PT EVAL MOD COMPLEXITY 2 6/6/2024 Trice Mabry, PT GP 1            PT G-Codes  Outcome Measure Options: AM-PAC 6 Clicks Basic Mobility (PT), PADD  AM-PAC 6 Clicks Score (PT): 19  PT Discharge Summary  Anticipated Discharge Disposition (PT): home with assist, home with home health    Trice Mabry PT  6/6/2024

## 2024-06-06 NOTE — ANESTHESIA POSTPROCEDURE EVALUATION
Patient: Diego Carlos    Procedure Summary       Date: 06/06/24 [facility] / [facility]  [facility]    Anesthesia Start: 0953 Anesthesia Stop: 1208    Procedure: TOTAL KNEE ARTHROPLASTY WITH CORI ROBOT - LEFT (Left: Knee) Diagnosis:       Primary localized osteoarthritis of left knee      (Left knee osteoarthritis)    Surgeons: Bhavin Tillman MD Provider: Chaparrita Williamson MD    Anesthesia Type: spinal ASA Status: 2            Anesthesia Type: spinal    Vitals  Vitals Value Taken Time   /66 06/06/24 1200   Temp     Pulse 47 06/06/24 1206   Resp     SpO2 97 % 06/06/24 1206   Vitals shown include unfiled device data.        Post Anesthesia Care and Evaluation    Patient location during evaluation: PACU  Patient participation: complete - patient participated  Level of consciousness: awake and alert  Pain management: adequate    Airway patency: patent  Anesthetic complications: No anesthetic complications  PONV Status: none  Cardiovascular status: hemodynamically stable and acceptable  Respiratory status: nonlabored ventilation, acceptable and nasal cannula  Hydration status: acceptable

## 2024-06-06 NOTE — BRIEF OP NOTE
TOTAL KNEE ARTHROPLASTY WITH CORI ROBOT  Progress Note    Diego Carlos  6/6/2024    Pre-op Diagnosis:   Left knee osteoarthritis       Post-Op Diagnosis Codes:     * Primary localized osteoarthritis of left knee [M17.12]    Procedure/CPT® Codes:  TX ARTHRP KNE CONDYLE&PLATU MEDIAL&LAT COMPARTMENTS [32006]  TX CPTR-ASST SURGICAL NAVIGATION IMAGE-LESS [36037]      Procedure(s):  TOTAL KNEE ARTHROPLASTY WITH CORI ROBOT - LEFT    Surgical Approach: Knee Medial Parapatellar            Surgeon(s):  Bhavin Tillman MD    Assistant: KENDAL Urena    Anesthesia: Spinal, local and adductor canal catheter    Staff:   Circulator: Pranav Koo, PHYLLIS; Myra Renner RN; Rommel Alexander RN  Physician Assistant: Norma Norman PA  Scrub Person: Gustavo Guy  Vendor Representative: Chase Carmona ([employer])  Nursing Assistant: Gatito Archibald         Estimated Blood Loss: minimal    Urine Voided: * No values recorded between 6/6/2024  9:51 AM and 6/6/2024 11:46 AM *    Specimens:                None          Drains: * No LDAs found *    Findings: Left knee severe tricompartmental degenerative changes with varus deformity        Complications: None    Implants: [employer] posterior stabilized total knee arthroplasty with a size 5 femur, 4 tibia, 9 polyethylene and 29 patella    Tourniquet time: 81 minutes at 300 mmHg          Bhavin Tillman MD     Date: 6/6/2024  Time: 12:03 EDT

## 2024-06-06 NOTE — ANESTHESIA PROCEDURE NOTES
Spinal Block      Patient reassessed immediately prior to procedure    Patient location during procedure: OR  Start Time: 6/6/2024 10:04 AM  Indication:at surgeon's request  Performed By  CRNA/AMANDA: Vincenzo Tuttle CRNASRNA: Aamir Mckeon SRNA  Preanesthetic Checklist  Completed: patient identified, IV checked, site marked, risks and benefits discussed, surgical consent, monitors and equipment checked, pre-op evaluation and timeout performed  Spinal Block Prep:  Patient Position:sitting  Sterile Tech:cap, gloves, sterile barriers and mask  Prep:Chloraprep  Patient Monitoring:blood pressure monitoring, continuous pulse oximetry and EKG    Spinal Block Procedure  Approach:midline  Guidance:landmark technique and palpation technique  Location:L4-L5  Needle Type:Rafal  Needle Gauge:25 G  Placement of Spinal needle event:cerebrospinal fluid aspirated  Paresthesia: no  Fluid Appearance:clear  Medications: Mepivacaine HCl (PF) (CARBOCAINE) 1.5 % injection - Injection   4 mL - 6/6/2024 10:01:00 AM   Post Assessment  Patient Tolerance:patient tolerated the procedure well with no apparent complications  Complications no  Additional Notes  Procedure:  Pt assisted to sitting position, with legs in position of comfort over side of bed.  Pt. instructed in optimal spine presentation, the spine was prepped/ Draped and the skin at insertion site was anesthetized with 1% Lidocaine 2 ml.  The spinal needle was then advanced until CSF flow was obtained and LA was injected:

## 2024-06-06 NOTE — ANESTHESIA PROCEDURE NOTES
LEFT Adductor canal cath      Patient reassessed immediately prior to procedure    Start time: 6/6/2024 11:49 AM  Reason for block: at surgeon's request and post-op pain management  Performed by  CRNA/CAA: Chris Waite, CRNA  Assisted by: Sarah Ro RNSRNA: Aamir Mckeon SRNA  Preanesthetic Checklist  Completed: patient identified, IV checked, site marked, risks and benefits discussed, surgical consent, monitors and equipment checked, pre-op evaluation and timeout performed  Prep:  Pt Position: supine  Sterile barriers:cap, gloves, mask, sterile barriers and washed/disinfected hands  Prep: ChloraPrep  Patient monitoring: blood pressure monitoring, continuous pulse oximetry and EKG  Procedure    Sedation: no  Performed under: spinal  Guidance:ultrasound guided    ULTRASOUND INTERPRETATION.  Using ultrasound guidance a 20 G gauge needle was placed in close proximity to the nerve, at which point, under ultrasound guidance anesthetic was injected in the area of the nerve and spread of the anesthesia was seen on ultrasound in close proximity thereto.  There were no abnormalities seen on ultrasound; a digital image was taken; and the patient tolerated the procedure with no complications. Images:still images obtained, printed/placed on chart    Laterality:left  Block Type:adductor canal block  Injection Technique:catheter  Needle Type:Tuohy and echogenic  Needle Gauge:18 G  Resistance on Injection: none  Catheter Size:20 G (20g)  Cath Depth at skin: 8 cm    Medications Used: bupivacaine PF (MARCAINE) 0.25 % injection - Injection   20 mL - 6/6/2024 11:49:00 AM      Post Assessment  Injection Assessment: negative aspiration for heme, incremental injection and no paresthesia on injection  Patient Tolerance:comfortable throughout block  Complications:no  Additional Notes  CATHETER   A high-frequency linear transducer, with sterile cover, was placed on the anterior mid-thigh (between the anterior superior iliac  "spine and patella). The transducer was then moved medially to identify the Sartorius muscle (Mary), Vastus Medialis muscle (VMM), Superficial Femoral Artery (SFA) and Vein. The transducer was then moved cephalad or caudad to position the SFA in the middle of the Mary. The insertion site was prepped and draped in sterile fashion. Skin and cutaneous tissue was infiltrated with 2-5 ml of 1% Lidocaine. Using ultrasound-guidance, an 18-gauge Contiplex Ultra 360 Touhy needle was advanced in plane from lateral to medial. Preservative-free normal saline was utilized for hydro-dissection of tissue, advancement of Touhy, and to confirm needle placement below the fascial plane of the Mary where the Nerve to the VMM is located. Local anesthetic (LA) 5 ml deposited here. The Touhy needle continues its path lateral to the SFA at the level of the Saphenous Nerve. The remainder of the LA was deposited at the 10-11 o'clock position of the SFA. This injection created a space between the Mary and the SFA. Aspiration every 5 ml to prevent intravascular injection. Injection was completed with negative aspiration of blood and negative intravascular injection. Injection pressures were normal with minimal resistance. A 20-gauge Contiplex Echo catheter was placed through the needle and advance out the tip of the Touhy 3-5 cm anterior to the SFA. The Touhy needle was then removed, and final catheter position verified at the 12 o'clock position to the SFA. The catheter was secured in the usual fashion with skin glue, benzoin, steri-strips, CHG tegaderm and label noting \"Nerve Block Catheter\". Jerk tape applied at yellow connector and catheter connection.             "

## 2024-06-06 NOTE — INTERVAL H&P NOTE
[facility] Pre-op    Full history and physical note from office is attached.    VS: /91  HR 58  RR 16  T 97.6  sat 96%RA    Review of Systems:  Constitutional-- No fever, chills or sweats. No fatigue.  CV-- No chest pain, palpitation or syncope  Resp-- No SOB, cough, hemoptysis  Skin--No rashes or lesions    Physical Exam:  Heart:   Regular rate and rhythm, S1 and S2 normal  Lungs: Clear to auscultation bilaterally, respirations unlabored    LAB Results:  Lab Results   Component Value Date    WBC 10.85 (H) 06/03/2024    HGB 13.5 06/03/2024    HCT 40.9 06/03/2024    MCV 96.9 06/03/2024     06/03/2024    GLUCOSE 98 06/03/2024    BUN 13 06/03/2024    CREATININE 0.73 (L) 06/03/2024     06/03/2024    K 4.2 06/03/2024     06/03/2024    CO2 27.0 06/03/2024    CALCIUM 9.0 06/03/2024    ALBUMIN 4.3 04/26/2023    AST 20 04/26/2023    ALT 23 04/26/2023    BILITOT 0.2 04/26/2023       Cancer Staging (if applicable)  Cancer Patient: __ yes __no __unknown__N/A; If yes, clinical stage T:__ N:__M:__, stage group or __N/A      Impression: Primary osteoarthritis left knee      Plan: TOTAL KNEE ARTHROPLASTY WITH CORI ROBOT - LEFT       JAMIE Harding   6/6/2024   08:07 EDT

## 2024-06-06 NOTE — PLAN OF CARE
Goal Outcome Evaluation:  Plan of Care Reviewed With: patient        Progress: improving  Outcome Evaluation: Patient required a knee immobiliizer to ambulate in hallway. Rn noted a dip in HR at end of ambulaton. Recommend continued KI untill patient able to preform SLR. He will be going home with home health.      Anticipated Discharge Disposition (PT): home with assist, home with home health

## 2024-06-07 VITALS
WEIGHT: 196.21 LBS | HEIGHT: 66 IN | DIASTOLIC BLOOD PRESSURE: 77 MMHG | RESPIRATION RATE: 16 BRPM | SYSTOLIC BLOOD PRESSURE: 146 MMHG | OXYGEN SATURATION: 95 % | HEART RATE: 59 BPM | TEMPERATURE: 98.3 F | BODY MASS INDEX: 31.53 KG/M2

## 2024-06-07 LAB
ANION GAP SERPL CALCULATED.3IONS-SCNC: 4 MMOL/L (ref 5–15)
BASOPHILS # BLD AUTO: 0.05 10*3/MM3 (ref 0–0.2)
BASOPHILS NFR BLD AUTO: 0.4 % (ref 0–1.5)
BUN SERPL-MCNC: 14 MG/DL (ref 8–23)
BUN/CREAT SERPL: 22.2 (ref 7–25)
CALCIUM SPEC-SCNC: 8.6 MG/DL (ref 8.6–10.5)
CHLORIDE SERPL-SCNC: 107 MMOL/L (ref 98–107)
CO2 SERPL-SCNC: 28 MMOL/L (ref 22–29)
CREAT SERPL-MCNC: 0.63 MG/DL (ref 0.76–1.27)
DEPRECATED RDW RBC AUTO: 45.9 FL (ref 37–54)
EGFRCR SERPLBLD CKD-EPI 2021: 108.2 ML/MIN/1.73
EOSINOPHIL # BLD AUTO: 0.11 10*3/MM3 (ref 0–0.4)
EOSINOPHIL NFR BLD AUTO: 0.8 % (ref 0.3–6.2)
ERYTHROCYTE [DISTWIDTH] IN BLOOD BY AUTOMATED COUNT: 12.9 % (ref 12.3–15.4)
GLUCOSE SERPL-MCNC: 103 MG/DL (ref 65–99)
HCT VFR BLD AUTO: 31.4 % (ref 37.5–51)
HGB BLD-MCNC: 10.5 G/DL (ref 13–17.7)
IMM GRANULOCYTES # BLD AUTO: 0.04 10*3/MM3 (ref 0–0.05)
IMM GRANULOCYTES NFR BLD AUTO: 0.3 % (ref 0–0.5)
LYMPHOCYTES # BLD AUTO: 5.11 10*3/MM3 (ref 0.7–3.1)
LYMPHOCYTES NFR BLD AUTO: 39.4 % (ref 19.6–45.3)
MCH RBC QN AUTO: 32 PG (ref 26.6–33)
MCHC RBC AUTO-ENTMCNC: 33.4 G/DL (ref 31.5–35.7)
MCV RBC AUTO: 95.7 FL (ref 79–97)
MONOCYTES # BLD AUTO: 1.03 10*3/MM3 (ref 0.1–0.9)
MONOCYTES NFR BLD AUTO: 7.9 % (ref 5–12)
NEUTROPHILS NFR BLD AUTO: 51.2 % (ref 42.7–76)
NEUTROPHILS NFR BLD AUTO: 6.62 10*3/MM3 (ref 1.7–7)
NRBC BLD AUTO-RTO: 0 /100 WBC (ref 0–0.2)
PLATELET # BLD AUTO: 153 10*3/MM3 (ref 140–450)
PMV BLD AUTO: 11.3 FL (ref 6–12)
POTASSIUM SERPL-SCNC: 3.7 MMOL/L (ref 3.5–5.2)
RBC # BLD AUTO: 3.28 10*6/MM3 (ref 4.14–5.8)
SODIUM SERPL-SCNC: 139 MMOL/L (ref 136–145)
WBC NRBC COR # BLD AUTO: 12.96 10*3/MM3 (ref 3.4–10.8)

## 2024-06-07 PROCEDURE — 97165 OT EVAL LOW COMPLEX 30 MIN: CPT

## 2024-06-07 PROCEDURE — 97535 SELF CARE MNGMENT TRAINING: CPT

## 2024-06-07 PROCEDURE — 25810000003 SODIUM CHLORIDE 0.9 % SOLUTION: Performed by: ORTHOPAEDIC SURGERY

## 2024-06-07 PROCEDURE — 25010000002 CEFAZOLIN PER 500 MG: Performed by: ORTHOPAEDIC SURGERY

## 2024-06-07 PROCEDURE — 85025 COMPLETE CBC W/AUTO DIFF WBC: CPT | Performed by: ORTHOPAEDIC SURGERY

## 2024-06-07 PROCEDURE — 97110 THERAPEUTIC EXERCISES: CPT

## 2024-06-07 PROCEDURE — 97530 THERAPEUTIC ACTIVITIES: CPT

## 2024-06-07 PROCEDURE — 80048 BASIC METABOLIC PNL TOTAL CA: CPT | Performed by: ORTHOPAEDIC SURGERY

## 2024-06-07 PROCEDURE — 97116 GAIT TRAINING THERAPY: CPT

## 2024-06-07 RX ORDER — ASPIRIN 81 MG/1
81 TABLET ORAL 2 TIMES DAILY
Qty: 60 TABLET | Refills: 0 | Status: SHIPPED | OUTPATIENT
Start: 2024-06-08

## 2024-06-07 RX ORDER — MELOXICAM 15 MG/1
15 TABLET ORAL DAILY
Qty: 15 TABLET | Refills: 0 | Status: SHIPPED | OUTPATIENT
Start: 2024-06-07 | End: 2024-06-22

## 2024-06-07 RX ORDER — ROPIVACAINE HYDROCHLORIDE 2 MG/ML
1 INJECTION, SOLUTION EPIDURAL; INFILTRATION; PERINEURAL CONTINUOUS
Start: 2024-06-07

## 2024-06-07 RX ORDER — DOCUSATE SODIUM 100 MG/1
100 CAPSULE, LIQUID FILLED ORAL 2 TIMES DAILY
Qty: 30 CAPSULE | Refills: 0 | Status: SHIPPED | OUTPATIENT
Start: 2024-06-07 | End: 2024-06-22

## 2024-06-07 RX ORDER — OXYCODONE HYDROCHLORIDE 5 MG/1
5 TABLET ORAL EVERY 4 HOURS PRN
Qty: 40 TABLET | Refills: 0 | Status: SHIPPED | OUTPATIENT
Start: 2024-06-07

## 2024-06-07 RX ORDER — ACETAMINOPHEN 500 MG
1000 TABLET ORAL EVERY 8 HOURS
Qty: 60 TABLET | Refills: 0 | Status: SHIPPED | OUTPATIENT
Start: 2024-06-07 | End: 2024-06-17

## 2024-06-07 RX ADMIN — ACETAMINOPHEN 1000 MG: 500 TABLET ORAL at 05:59

## 2024-06-07 RX ADMIN — ACETAMINOPHEN 1000 MG: 500 TABLET ORAL at 00:57

## 2024-06-07 RX ADMIN — LISINOPRIL 20 MG: 20 TABLET ORAL at 10:15

## 2024-06-07 RX ADMIN — SODIUM CHLORIDE 2 G: 900 INJECTION INTRAVENOUS at 00:59

## 2024-06-07 RX ADMIN — OXYCODONE HYDROCHLORIDE 5 MG: 5 TABLET ORAL at 10:15

## 2024-06-07 RX ADMIN — ASPIRIN 81 MG: 81 TABLET, COATED ORAL at 10:15

## 2024-06-07 RX ADMIN — MELOXICAM 15 MG: 15 TABLET ORAL at 10:15

## 2024-06-07 RX ADMIN — SODIUM CHLORIDE 100 ML/HR: 9 INJECTION, SOLUTION INTRAVENOUS at 06:22

## 2024-06-07 RX ADMIN — ACETAMINOPHEN 1000 MG: 500 TABLET ORAL at 14:53

## 2024-06-07 RX ADMIN — OXYCODONE HYDROCHLORIDE 5 MG: 5 TABLET ORAL at 14:53

## 2024-06-07 RX ADMIN — SENNOSIDES AND DOCUSATE SODIUM 2 TABLET: 8.6; 5 TABLET ORAL at 10:15

## 2024-06-07 NOTE — THERAPY EVALUATION
Patient Name: Diego Carlos  : 1960    MRN: [id number]                              Today's Date: 2024       Admit Date: 2024    Visit Dx:     ICD-10-CM ICD-9-CM   1. Status post left knee replacement  Z96.652 V43.65     Patient Active Problem List   Diagnosis    Primary localized osteoarthritis of left knee    Status post left knee replacement    HTN (hypertension)    Obesity (BMI 30-39.9)     Past Medical History:   Diagnosis Date    Hypertension      Past Surgical History:   Procedure Laterality Date    KNEE ARTHROSCOPY Left     TOTAL KNEE ARTHROPLASTY Left 2024    Procedure: TOTAL KNEE ARTHROPLASTY WITH CORI ROBOT - LEFT;  Surgeon: Bhavin Tillman MD;  Location: [facility];  Service: Robotics - Ortho;  Laterality: Left;      General Information       Row Name 24 0952          OT Time and Intention    Document Type evaluation (P)   -LR     Mode of Treatment individual therapy;occupational therapy (P)   -LR       Row Name 2452          General Information    Patient Profile Reviewed yes (P)   -LR     Prior Level of Function independent:;all household mobility;home management;bed mobility;driving;community mobility (P)   no falls reported in the last 6 months  -LR     Existing Precautions/Restrictions fall;left;other (see comments) (P)    used (Fijian) interpretor cart 3G,  #[id number] CARMELA Lomeli WBAT, adductor nerve cath, KI for mobility until quad function improves per PT  -LR     Barriers to Rehab language barrier (P)   -LR       Row Name 2452          Occupational Profile    Environmental Supports and Barriers (Occupational Profile) Walk in shower, regular toilet height with sink beside toilet (P)   -LR       Row Name 2452          Living Environment    People in Home spouse;child(arvin), adult;other (see comments) (P)   nephew  -LR       Row Name 2452          Home Main Entrance    Number of Stairs, Main Entrance none  (P)   -LR     Stair Railings, Main Entrance -- (P)   -LR       Row Name 06/07/24 0952          Stairs Within Home, Primary    Number of Stairs, Within Home, Primary none (P)   -LR       Row Name 06/07/24 0952          Cognition    Orientation Status (Cognition) oriented x 4 (P)   -LR       Row Name 06/07/24 0952          Safety Issues, Functional Mobility    Safety Issues Affecting Function (Mobility) awareness of need for assistance;positioning of assistive device (P)   -LR     Impairments Affecting Function (Mobility) pain;range of motion (ROM);endurance/activity tolerance (P)   -LR     Comment, Safety Issues/Impairments (Mobility) Educated pt on rolling FWW instead of picking it up for optimal safety (P)   -LR               User Key  (r) = Recorded By, (t) = Taken By, (c) = Cosigned By      Initials Name Provider Type    Mary Huntley OT Student OT Student                     Mobility/ADL's       Row Name 06/07/24 1004          Bed Mobility    Comment, (Bed Mobility) Received [facility]. Educated pt on how leg  can be used for bed mobility. (P)   -LR       Row Name 06/07/24 1004          Transfers    Transfers sit-stand transfer;stand-sit transfer;toilet transfer (P)   -LR     Comment, (Transfers) VC for optimal hand/AD placement and proper FWW sequencing. Stepping foot forward for maximum comfort (P)   -LR       Row Name 06/07/24 1004          Sit-Stand Transfer    Sit-Stand [place] (Transfers) supervision;nonverbal cues (demo/gesture);verbal cues;other (see comments) (P)   pt stood from chair impulsivly d/t language barrier  -LR     Assistive Device (Sit-Stand Transfers) walker, front-wheeled (P)   -LR       Row Name 06/07/24 1004          Stand-Sit Transfer    Stand-Sit [place] (Transfers) contact guard;nonverbal cues (demo/gesture);verbal cues (P)   -LR     Assistive Device (Stand-Sit Transfers) walker, front-wheeled (P)   -LR       Row Name 06/07/24 1004          Toilet Transfer    Type (Toilet  Transfer) sit-stand;stand-sit (P)   -LR     [place] Level (Toilet Transfer) contact guard;nonverbal cues (demo/gesture);verbal cues (P)   -LR     Assistive Device (Toilet Transfer) commode;raised toilet seat;walker, front-wheeled (P)   -LR     Comment, (Toilet Transfer) VC for optimal safety transfer and AD placement. (P)   -LR       Row Name 06/07/24 1004          Functional Mobility    Functional Mobility- Ind. Level contact guard assist;nonverbal cues required (demo/gesture);verbal cues required (P)   -LR     Functional Mobility- Device walker, front-wheeled (P)   -LR     Functional Mobility-Distance (Feet) -- (P)   In room  -LR     Functional Mobility-Maintain WBing able to maintain weight bearing status (P)   -LR     Functional Mobility- Comment Defer to PT for specifics. Educated on advancing FWW to conserve energy versus picking up (P)   -LR     Patient was able to Ambulate yes (P)   -LR       Row Name 06/07/24 1004          Activities of Daily Living    BADL Assessment/Intervention bathing;lower body dressing;toileting (P)   -LR       Row Name 06/07/24 1004          Mobility    Extremity Weight-bearing Status left lower extremity (P)   -LR     Left Lower Extremity (Weight-bearing Status) weight-bearing as tolerated (WBAT) (P)   -LR       Row Name 06/07/24 1004          Bathing Assessment/Intervention    [place] Level (Bathing) not tested (P)   -LR     Assistive Devices (Bathing) long-handled sponge (P)   -LR     Comment, (Bathing) Issued LHS and educated pt and spouse on use for comfort. Educated on no showering until nerve cath is out. (P)   -LR       Row Name 06/07/24 1004          Lower Body Dressing Assessment/Training    [place] Level (Lower Body Dressing) doff;don;socks;standby assist;verbal cues;nonverbal cues (demo/gesture) (P)   -LR     Assistive Devices (Lower Body Dressing) leg ;long-handled shoe horn;reacher;sock-aid (P)   -LR     Position (Lower Body Dressing) supported  sitting (P)   -LR     Comment, (Lower Body Dressing) Issued AE and educated on use. Educated pt and spouse on optimal sequencing for LBD and nerve cath precautions. (P)   -LR       Row Name 06/07/24 1004          Toileting Assessment/Training    [place] Level (Toileting) not tested (P)   -LR     Assistive Devices (Toileting) raised toilet seat;commode (P)   -LR     Comment, (Toileting) Pt stated that he did not have to use the restroom, but performed a STS on the commode for transfer education. Educated when toileting, to closely monitor nerve cath to avoid dislodgement (P)   -LR               User Key  (r) = Recorded By, (t) = Taken By, (c) = Cosigned By      Initials Name Provider Type    Mary Huntley, OT Student OT Student                   Obj/Interventions       Row Name 06/07/24 1012          Sensory Assessment (Somatosensory)    Sensory Assessment (Somatosensory) bilateral UE;sensation intact (P)   -LR     Bilateral UE Sensory Assessment general sensation;light touch awareness;intact (P)   -LR       Row Name 06/07/24 1012          Vision Assessment/Intervention    Visual Impairment/Limitations WFL (P)   -LR       Row Name 06/07/24 1012          Range of Motion Comprehensive    General Range of Motion bilateral upper extremity ROM WNL (P)   -LR       [facility] Name 06/07/24 1012          Strength Comprehensive (MMT)    General Manual Muscle Testing (MMT) Assessment no strength deficits identified (P)   -LR     Comment, General Manual Muscle Testing (MMT) Assessment 5/5 per MMT (P)   -LR       Row Name 06/07/24 1012          Balance    Balance Assessment sitting static balance;sitting dynamic balance;standing static balance;standing dynamic balance (P)   -LR     Static Sitting Balance independent (P)   -LR     Dynamic Sitting Balance standby assist (P)   -LR     Position, Sitting Balance supported;unsupported;sitting in chair (P)   -LR     Static Standing Balance contact guard (P)   -LR     Dynamic Standing  Balance contact guard;non-verbal cues (demo/gesture);verbal cues (P)   -LR     Position/Device Used, Standing Balance walker, front-wheeled (P)   -LR     Balance Interventions sitting;standing;dynamic;static;occupation based/functional task (P)   -LR               User Key  (r) = Recorded By, (t) = Taken By, (c) = Cosigned By      Initials Name Provider Type    LR Mary Mcallister OT Student OT Student                   Goals/Plan       Row Name 06/07/24 1024          Transfer Goal 1 (OT)    Activity/Assistive Device (Transfer Goal 1, OT) sit-to-stand/stand-to-sit;bed-to-chair/chair-to-bed;toilet (P)   -LR     [place] Level/Cues Needed (Transfer Goal 1, OT) standby assist (P)   -LR     Time Frame (Transfer Goal 1, OT) long term goal (LTG);5 days (P)   -LR       Row Name 06/07/24 1024          Dressing Goal 1 (OT)    Activity/Device (Dressing Goal 1, OT) lower body dressing;long-handled shoe horn;reacher;sock-aid (P)   -LR     [place]/Cues Needed (Dressing Goal 1, OT) modified independence (P)   -LR     Time Frame (Dressing Goal 1, OT) short term goal (STG);3 days (P)   -LR     Progress/Outcome (Dressing Goal 1, OT) new goal (P)   -LR       Row Name 06/07/24 1024          Therapy Assessment/Plan (OT)    Planned Therapy Interventions (OT) activity tolerance training;adaptive equipment training;BADL retraining;IADL retraining;functional balance retraining;occupation/activity based interventions;patient/caregiver education/training;transfer/mobility retraining (P)   -LR               User Key  (r) = Recorded By, (t) = Taken By, (c) = Cosigned By      Initials Name Provider Type    LR Mary Mcallister OT Student OT Student                   Clinical Impression       Row Name 06/07/24 1013          Pain Assessment    Pretreatment Pain Rating 5/10 (P)   -LR     Posttreatment Pain Rating 3/10 (P)   -LR     Pain Location - Side/Orientation Left (P)   -LR     Pain Location upper (P)   -LR     Pain Location - knee (P)    -LR     Pre/Posttreatment Pain Comment Pt stated that pain decreased after OT interventions (P)   -LR     Pain Intervention(s) Repositioned;Ambulation/increased activity;Cold applied;Cold pack (P)   -LR       Row Name 06/07/24 1013          Plan of Care Review    Plan of Care Reviewed With patient;spouse (P)   -LR     Progress improving (P)   -LR     Outcome Evaluation OT priscillaal completed this date. Pt presents below baseline with increased pain, decreased activity tolerance for ADLs, mild balance deficits, and decreased distal reach. Educated pt and spouse on nerve cath precautions. Issued AE and educated on use with good teach back from pt. Standby A for doffing/donning socks with reacher/sock aid. Recommend IPOT POC and D/C home with assist when medically appropriate. (P)   -LR       Row Name 06/07/24 1013          Therapy Assessment/Plan (OT)    Patient/Family Therapy Goal Statement (OT) Return home when medically appropriate. (P)   -LR     Rehab Potential (OT) good, to achieve stated therapy goals (P)   -LR     Criteria for Skilled Therapeutic Interventions Met (OT) yes;skilled treatment is necessary (P)   -LR     Therapy Frequency (OT) daily (P)   -LR       Row Name 06/07/24 1013          Therapy Plan Review/Discharge Plan (OT)    Anticipated Discharge Disposition (OT) home with assist (P)   -LR       Row Name 06/07/24 1013          Vital Signs    Pre Systolic BP Rehab 141 (P)   -LR     Pre Treatment Diastolic BP 74 (P)   -LR     Pretreatment Heart Rate (beats/min) 57 (P)   -LR     Intratreatment Heart Rate (beats/min) 60 (P)   -LR     Posttreatment Heart Rate (beats/min) 61 (P)   -LR     Pre SpO2 (%) 96 (P)   -LR     O2 Delivery Pre Treatment room air (P)   -LR     Intra SpO2 (%) 96 (P)   -LR     O2 Delivery Intra Treatment room air (P)   -LR     Post SpO2 (%) 95 (P)   -LR     O2 Delivery Post Treatment room air (P)   -LR     Pre Patient Position Sitting (P)   -LR     Intra Patient Position Standing (P)   -LR      Post Patient Position Sitting (P)   -LR       Row Name 06/07/24 1013          Positioning and Restraints    Pre-Treatment Position sitting in chair/recliner (P)   -LR     Post Treatment Position chair (P)   -LR     In Chair call light within reach;encouraged to call for assist;legs elevated;L knee immobilizer;with PT;with family/caregiver;waffle cushion;with brace (P)   KI left on for immediate PT assessment following OT session  -LR               User Key  (r) = Recorded By, (t) = Taken By, (c) = Cosigned By      Initials Name Provider Type    LR Mary Mcallister, OT Student OT Student                   Outcome Measures       Row Name 06/07/24 1026          How much help from another is currently needed...    Putting on and taking off regular lower body clothing? 3 (P)   -LR     Bathing (including washing, rinsing, and drying) 3 (P)   -LR     Toileting (which includes using toilet bed pan or urinal) 3 (P)   -LR     Putting on and taking off regular upper body clothing 4 (P)   -LR     Taking care of personal grooming (such as brushing teeth) 4 (P)   -LR     Eating meals 4 (P)   -LR     AM-PAC 6 Clicks Score (OT) 21 (P)   -LR       Row Name 06/07/24 0950          How much help from another person do you currently need...    Turning from your back to your side while in flat bed without using bedrails? 4  -LS     Moving from lying on back to sitting on the side of a flat bed without bedrails? 3  -LS     Moving to and from a bed to a chair (including a wheelchair)? 4  -LS     Standing up from a chair using your arms (e.g., wheelchair, bedside chair)? 4  -LS     Climbing 3-5 steps with a railing? 3  -LS     To walk in hospital room? 4  -LS     AM-PAC 6 Clicks Score (PT) 22  -LS     Highest Level of Mobility Goal 7 --> Walk 25 feet or more  -       Row Name 06/07/24 1026 06/07/24 0950       Functional Assessment    Outcome Measure Options AM-PAC 6 Clicks Daily Activity (OT) (P)   -LR AM-PAC 6 Clicks Basic Mobility (PT)   -              User Key  (r) = Recorded By, (t) = Taken By, (c) = Cosigned By      Initials Name Provider Type    LS Steffanie Reno, PT Physical Therapist    LR Mary Mcallister, OT Student OT Student                    Occupational Therapy Education       Title: PT OT SLP Therapies (Resolved)       Topic: Occupational Therapy (Resolved)       Point: ADL training (Resolved)       Description:   Instruct learner(s) on proper safety adaptation and remediation techniques during self care or transfers.   Instruct in proper use of assistive devices.                  Learning Progress Summary             Patient Acceptance, E,TB,D, VU,DU by LR at 6/7/2024 0858   Significant Other Acceptance, E,TB,D, VU,DU by LR at 6/7/2024 0858                         Point: Precautions (Resolved)       Description:   Instruct learner(s) on prescribed precautions during self-care and functional transfers.                  Learning Progress Summary             Patient Acceptance, E,TB,D, VU,DU by LR at 6/7/2024 0858   Significant Other Acceptance, E,TB,D, VU,DU by LR at 6/7/2024 0858                         Point: Body mechanics (Resolved)       Description:   Instruct learner(s) on proper positioning and spine alignment during self-care, functional mobility activities and/or exercises.                  Learning Progress Summary             Patient Acceptance, E,TB,D, VU,DU by LR at 6/7/2024 0858   Significant Other Acceptance, E,TB,D, VU,DU by LR at 6/7/2024 0858                                         User Key       Initials Effective Dates Name Provider Type Discipline     03/21/24 -  Mary Mcallister, OT Student OT Student OT                  OT Recommendation and Plan  Planned Therapy Interventions (OT): (P) activity tolerance training, adaptive equipment training, BADL retraining, IADL retraining, functional balance retraining, occupation/activity based interventions, patient/caregiver education/training, transfer/mobility  retraining  Therapy Frequency (OT): (P) daily  Plan of Care Review  Plan of Care Reviewed With: (P) patient, spouse  Progress: (P) improving  Outcome Evaluation: (P) OT eval completed this date. Pt presents below baseline with increased pain, decreased activity tolerance for ADLs, mild balance deficits, and decreased distal reach. Educated pt and spouse on nerve cath precautions. Issued AE and educated on use with good teach back from pt. Standby A for doffing/donning socks with reacher/sock aid. Recommend IPOT POC and D/C home with assist when medically appropriate.     Time Calculation:   Evaluation Complexity (OT)  Review Occupational Profile/Medical/Therapy History Complexity: (P) brief/low complexity  Assessment, Occupational Performance/Identification of Deficit Complexity: (P) 1-3 performance deficits  Clinical Decision Making Complexity (OT): (P) problem focused assessment/low complexity  Overall Complexity of Evaluation (OT): (P) low complexity     Time Calculation- OT       Row Name 06/07/24 1027 06/07/24 0950          Time Calculation- OT    OT Start Time 0858 (P)   -LR --     OT Received On 06/07/24 (P)   -LR --     OT Goal Re-Cert Due Date 06/17/24 (P)   -LR --        Timed Charges    73312 - Gait Training Minutes  -- 10  -LS     47846 - OT Therapeutic Activity Minutes 26 (P)   -LR --     15420 - OT Self Care/Mgmt Minutes 15 (P)   -LR --        Untimed Charges    OT Eval/Re-eval Minutes 50 (P)   -LR --        Total Minutes    Timed Charges Total Minutes 41 (P)   -LR 10  -LS     Untimed Charges Total Minutes 50 (P)   -LR --      Total Minutes 91 (P)   -LR 10  -LS               User Key  (r) = Recorded By, (t) = Taken By, (c) = Cosigned By      Initials Name Provider Type    LS Steffanie Reno, PT Physical Therapist    LR Mary Mcallister, OT Student OT Student                  Therapy Charges for Today       Code Description Service Date Service Provider Modifiers Qty    80137902513 HC OT THERAPEUTIC  ACT EA 15 MIN 6/7/2024 Mary Mcallister, OT Student GO 2    06180230031 HC OT SELF CARE/MGMT/TRAIN EA 15 MIN 6/7/2024 Mary Mcallister, OT Student GO 1    37017883343 HC OT EVAL LOW COMPLEXITY 4 6/7/2024 Mary Mcallister, OT Student GO 1                 Mary Mcallister OT Student  6/7/2024

## 2024-06-07 NOTE — DISCHARGE INSTRUCTIONS
Constipation: Patient complains of constipation.  Stool pattern has been {numbers 0-6:32528} {stool descriptions:04639} stool(s) {time unit:62077}. Onset was {0-10:26317} {time units:11} ago Defecation has been {defecation:26662}. Co-Morbid conditions:{constipating co-morbid:26644}. Symptoms have been {symptom progression:75307}. Current Health Habits: Eating fiber? {yes/amt/no:30925} Exercise?{yes***/no:44950} Water intake? {yes/amt/no:29874} Current OTC/RX therapy has been {laxative list:11399} which has been {effective/ineffective:07530}.  “I received and reviewed a copy of the BHLEX Joint Replacement Guide, understand the individualized plan of care and self-management training program developed and do not have any questions.” InfuBLOCK - Patient Information    What is a pain pump?  InfuBLOCK is a postoperative, non-narcotic pain relief system that delivers local anesthetic to or near the surgical site. This is a pain minimizing therapy that delivers an anesthetic (numbing) medicine to the nerve.    The InfuBLOCK pain pump will continuously deliver a local anesthetic medication to block the pain in the area of your procedure.    Where can I find information about my pain pump?           For more information about your pain pump, scan the QR code.  For additional patient resources, visit [employer]/resources-pain-management.                                                                                             The [facility] Hotline is Here for You 24/7.     Call [phone] for Assistance.  While your physician is your primary source for information about your treatment., there may be times during your treatment that you need assistance with your infusion pump. Our team of compassionate and knowledgeable Registered Nursed (RN) is here to assist every step of the way.    Answers to questions about your infusion pump                 Tubing disconnect  Assistance with pump alarms                                                       Dislodged catheter  Excessive leakage noted from pump                                         Inadequate pain control   Nerve Catheter Removal Instructions  When your device is empty:    Remove your catheter by pulling the dressing off slowly (like you would remove a regular bandage). The catheter should pull right out of the skin.  Check that the BLUE tip is intact.                                                                                     If the catheter is stuck, reposition your   extremity and pull slowly until removed.  *If catheter is HURTING and WON'T come out, stop and call [phone] for further assistance.    Remove medication bag from the black carrying case.  Cut the tubing on right and left side of pump, and discard the medication bag and tubing into garbage.  Place the pump and black carrying case into the plastic bag and then place this into the return box.  Seal box with blue stickers and return to [employer] postal service.    THIS IS PRE-PAID POSTAGE. [facility]MI COLD THERAPY - PATIENT INSTRUCTION SHEET    Cold Compression Therapy for your comfort and rehabilitation  Your caregivers want you to be productive in your rehab and comfortable during your stay. In keeping with those goals, you will be receiving an SMI Cold Therapy Wrap to help ease post-operative pain and swelling that might keep you from getting back on track! Your SMI Cold Therapy Wrap is effective and simple-to-use, and you will be encouraged to apply it throughout your hospital stay and at home through the duration of your recovery.    When you are ready to go home  Be sure to take your SMI Cold Therapy Wrap and both sets of Gel Bags with you for continued comfort and use throughout your rehabilitation. If you don't already have them, ask your nurse or aide to retrieve your SMI Gel Bags from the patient freezer.    Home use precautions  Always follow your medical professional's application  instructions upon discharge. Your [facility] Cold Therapy Wrap and Gel Bags are designed to last for months following your surgery. Never heat the Gel Bags unless specified by your healthcare provider. Supervision is advised when using this product on children or geriatric patients. To avoid danger of suffocation, please keep the outer plastic packaging away from children & pets.    Cold Therapy Instructions  Place Gel Bags in a freezer set ¾ of the way to max temperature for at least (4) hours. For best results, lay the Gel Bags flat and [id number] in the freezer. Once frozen, slide Gel Bags into the gel pouch and secure your wrap to the affected area with the straps.  Gel wraps that have been stored in a freezer for an extended period of time may require a (10) minute period of softening up in a room temperature environment before application.  The gel pouch acts as a protective barrier. NEVER place frozen bags directly onto skin, as this may cause frostbite injury.  The [facility] Cold Therapy Wrap is designed to be able to be worm while ambulating. The compression straps can be secured well enough so that the Wrap won't fall off while moving.  Wrap Application Videos can be viewed at [employer].  An additional protective barrier such as clothing, a washcloth, hand-towel or pillowcase may be used during prolonged treatment applications.  The Gel-Pouch and Wrap are both Latex-Free and the Gel Bag ingredients are non toxic.    [facility] Wrap care instructions  The [facility] Cold Therapy Wrap may be hand washed and hung to dry when needed.    [facility] re-order information  Additional [facility] body specific wraps and/or Gel Bags can be re-ordered from [employer] or call [employer] ([phone])    Nerve Catheter Removal Instructions  When your device is empty:    Remove your catheter by pulling the dressing off slowly (like you would remove a regular bandage). The catheter should pull right out of the skin.  Check that  the BLUE tip is intact.                                                                                     If the catheter is stuck, reposition your   extremity and pull slowly until removed.  *If catheter is HURTING and WON'T come out, stop and call [phone] for further assistance.    Remove medication bag from the black carrying case.  Cut the tubing on right and left side of pump, and discard the medication bag and tubing into garbage.  Place the pump and black carrying case into the plastic bag and then place this into the return box.  Seal box with blue stickers and return to [place] postal service. THIS IS PRE-PAID POSTAGE. [facility] COLD THERAPY - PATIENT INSTRUCTION SHEET    Cold Compression Therapy for your comfort and rehabilitation  Your caregivers want you to be productive in your rehab and comfortable during your stay. In keeping with those goals, you will be receiving an SMI Cold Therapy Wrap to help ease post-operative pain and swelling that might keep you from getting back on track! Your SMI Cold Therapy Wrap is effective and simple-to-use, and you will be encouraged to apply it throughout your hospital stay and at home through the duration of your recovery.    When you are ready to go home  Be sure to take your SMI Cold Therapy Wrap and both sets of Gel Bags with you for continued comfort and use throughout your rehabilitation. If you don't already have them, ask your nurse or aide to retrieve your SMI Gel Bags from the patient freezer.    Home use precautions  Always follow your medical professional's application instructions upon discharge. Your SMI Cold Therapy Wrap and Gel Bags are designed to last for months following your surgery. Never heat the Gel Bags unless specified by your healthcare provider. Supervision is advised when using this product on children or geriatric patients. To avoid danger of suffocation, please keep the outer plastic packaging away from children & pets.    Cold Therapy  Instructions  Place Gel Bags in a freezer set ¾ of the way to max temperature for at least (4) hours. For best results, lay the Gel Bags flat and [id number] in the freezer. Once frozen, slide Gel Bags into the gel pouch and secure your wrap to the affected area with the straps.  Gel wraps that have been stored in a freezer for an extended period of time may require a (10) minute period of softening up in a room temperature environment before application.  The gel pouch acts as a protective barrier. NEVER place frozen bags directly onto skin, as this may cause frostbite injury.  The [facility] Cold Therapy Wrap is designed to be able to be worm while ambulating. The compression straps can be secured well enough so that the Wrap won't fall off while moving.  Wrap Application Videos can be viewed at [employer].  An additional protective barrier such as clothing, a washcloth, hand-towel or pillowcase may be used during prolonged treatment applications.  The Gel-Pouch and Wrap are both Latex-Free and the Gel Bag ingredients are non toxic.    [facility] Wrap care instructions  The [facility] Cold Therapy Wrap may be hand washed and hung to dry when needed.    [facility] re-order information  Additional [facility] body specific wraps and/or Gel Bags can be re-ordered from [employer] or call [employer] ([phone])

## 2024-06-07 NOTE — PLAN OF CARE
Goal Outcome Evaluation:  Plan of Care Reviewed With: patient, spouse        Progress: improving  Outcome Evaluation: Improving mobility. Able to walk without KI without any buckling or signs of instability.

## 2024-06-07 NOTE — PROGRESS NOTES
[facility]    Acute pain service Inpatient Progress Note    Patient Name: Diego Carlos  :  1960  MRN:  [id number]        Acute Pain  Service Inpatient Progress Note:    Analgesia:Good  Pain Score:0/10  LOC: alert and awake  Resp Status: room air  Cardiac: VS stable  Side Effects:None  Catheter Site:clean, dressing intact and dry  Cath type: peripheral nerve cath with ON Q  Volume: 1mL,8ml, 8ml InfuSystem Pump.  Catheter Plan:Catheter to remain Insitu and Continue catheter infusion rate unchanged  Comments:

## 2024-06-07 NOTE — DISCHARGE SUMMARY
Patient Name: Diego Carlos  MRN: [id number]  : 1960  DOS: 2024    Attending: Bhavin Tillman,*    Primary Care Provider: Provider, No Known    Date of Admission:.2024  7:09 AM    Date of Discharge:  2024    Discharge Diagnosis:   Status post left knee replacement    Primary localized osteoarthritis of left knee    HTN (hypertension)    Obesity (BMI 30-39.9)      Hospital Course    At admit:  Patient is a pleasant 63 y.o. male presented for scheduled surgery by Dr. Tillman.     Per his note (Diego is a very pleasant 63-year-old male who has struggled with end-stage activity limiting left knee pain secondary to osteoarthritis.  X-rays in January revealed severe tricompartmental degenerative changes in a varus knee with complete loss of medial joint space and marginal osteophyte formation.  They have failed exhaustive conservative treatment measures including cortisone injections and physical therapy.  History of a left knee scope with partial medial meniscectomy May 2023 for work injury.  After undergoing a shared decision-making process they agreed to proceed with left total knee arthroplasty.  Surgical consent form was signed.).     Patient underwent left total knee arthroplasty under spinal anesthesia, tolerated surgery well, adductor canal nerve block catheter was placed back pain service, has been admitted for further management.     Seen in PACU postop, doing well, good pain control.  He has ambulated with PT.  No nausea, vomiting, or shortness of breath.     He was noted to have sinus bradycardia into the 40s and briefly into the 30s.  This has since recovered and when I saw him he is in sinus rhythm with a rate close to 70.     He has no history of DVT or PE.     After admit:    Patient was provided pain medications as needed for pain control, along with adductor canal nerve block infusion of Ropivacaine.      Adjustments were made to pain medications to optimize postop pain  management. Risks and benefits of opiate medications discussed with patient. ANGELA report was reviewed.    He was seen by PT and OT and has progressed well over his stay.    He had no further significant bradycardic episodes on postop day 1.    Patient used an IS for atelectasis prophylaxis and aspirin along with mechanicals for DVT prophylaxis.    Home medications were resumed as appropriate, and labs were monitored and remained fairly stable.     With the progress he has made, patient is ready for DC home today.      He will have an Infupump ( instructed on it during this admit).    Discussed with patient regarding plan and he shows understanding and agreement.    Patient will have PT following discharge.        Procedures Performed  Procedure(s):  TOTAL KNEE ARTHROPLASTY WITH CORI ROBOT - LEFT       Pertinent Test Results:    I reviewed the patient's new clinical results.   Results from last 7 days   Lab Units 06/07/24  1133 06/03/24  1414   WBC 10*3/mm3 12.96* 10.85*   HEMOGLOBIN g/dL 10.5* 13.5   HEMATOCRIT % 31.4* 40.9   PLATELETS 10*3/mm3 153 193     Results from last 7 days   Lab Units 06/03/24  1414   SODIUM mmol/L 141   POTASSIUM mmol/L 4.2   CHLORIDE mmol/L 106   CO2 mmol/L 27.0   BUN mg/dL 13   CREATININE mg/dL 0.73*   CALCIUM mg/dL 9.0   GLUCOSE mg/dL 98     I reviewed the patient's new imaging including images and reports.      Physical therapy  Steffanie Reno, PT   Physical Therapist  Physical Therapy     Plan of Care      Signed     Date of Service: 06/07/24 0950  Creation Time: 06/07/24 1051     Signed         Goal Outcome Evaluation:  Plan of Care Reviewed With: patient, spouse  Progress: improving  Outcome Evaluation: Improving mobility. Able to walk without KI without any buckling or signs of instability.            Occupational Therapy    Mary Mcallister, OT Student   OT Student  Occupational Therapy     Plan of Care      Attested     Date of Service: 06/07/24 0858  Creation Time:  "24 6115     Attested           Attestation signed by Nancy Izaguirre OT at 24 1511     I attest, as a qualified practitioner, that I was present for the entire session of this patient's care without engagement in other tasks and that the student's participation was guided by my direction and skilled clinical judgement.               Goal Outcome Evaluation:  Plan of Care Reviewed With: (P) patient, spouse  Progress: (P) improving  Outcome Evaluation: (P) OT eval completed this date. Pt presents below baseline with increased pain, decreased activity tolerance for ADLs, mild balance deficits, and decreased distal reach. Educated pt and spouse on nerve cath precautions. Issued AE and educated on use with good teach back from pt. Standby A for doffing/donning socks with reacher/sock aid. Recommend IPOT POC and D/C home with assist when medically appropriate.        Anticipated Discharge Disposition (OT): (P) home with assist                  Cosigned by: Nancy Izaguirre OT at 24     Discharge Assessment:       Visit Vitals  /77 (BP Location: Left arm, Patient Position: Sitting)   Pulse 59   Temp 98.3 °F (36.8 °C) (Oral)   Resp 16   Ht 166.4 cm (65.5\")   Wt 89 kg (196 lb 3.4 oz)   SpO2 95%   BMI 32.15 kg/m²     Temp (24hrs), Av.7 °F (36.5 °C), Min:97 °F (36.1 °C), Max:98.3 °F (36.8 °C)      General Appearance:    Alert, cooperative, in no acute distress   Lungs:     Clear to auscultation,respirations regular, even and                   unlabored    Heart:    Regular rhythm and normal rate, normal S1 and S2   Abdomen:     Normal bowel sounds, no masses, no organomegaly, soft        non-tender, non-distended, no guarding, no rebound                 tenderness   Extremities:   CDI dressing surgical knee . ACB cath present, infupump.   Pulses:   Pulses palpable and equal bilaterally   Skin:   No bleeding, bruising or rash   Neurologic:   Cranial nerves 2 - 12 grossly intact, sensation intact, " Flexion and dorsiflexion intact bilateral feet.         Discharge Disposition: Home          Discharge Medications        New Medications        Instructions Start Date   acetaminophen 500 MG tablet  Commonly known as: TYLENOL   1,000 mg, Oral, Every 8 Hours, Take every 8 hours  as needed after 1 week      aspirin 81 MG EC tablet  Commonly known as: ASPIR   81 mg, Oral, 2 Times Daily   Start Date: June 8, 2024     docusate sodium 100 MG capsule  Commonly known as: COLACE   100 mg, Oral, 2 Times Daily      meloxicam 15 MG tablet  Commonly known as: MOBIC   15 mg, Oral, Daily      oxyCODONE 5 MG immediate release tablet  Commonly known as: Roxicodone   5 mg, Oral, Every 4 Hours PRN      ropivacaine 0.2 % infusion (INFUSYSTEM)  Commonly known as: NAROPIN   1 mL/hr (2 mg/hr), Peripheral Nerve, Continuous             Continue These Medications        Instructions Start Date   lisinopril 20 MG tablet  Commonly known as: PRINIVIL,ZESTRIL   20 mg, Oral, Daily               Discharge Diet: Resume prior    Activity at Discharge: Weightbearing as tolerated left lower extremity    Follow-up Appointments:  Bhavin Tillman MD per his orders      Dragon disclaimer:  Part of this encounter note is an electronic transcription/translation of spoken language to printed text. The electronic translation of spoken language may permit erroneous, or at times, nonsensical words or phrases to be inadvertently transcribed; Although I have reviewed the note for such errors, some may still exist.       Mely Marti MD  06/07/24  12:14 EDT

## 2024-06-07 NOTE — THERAPY TREATMENT NOTE
Patient Name: Diego Carlos  : 1960    MRN: [id number]                              Today's Date: 2024       Admit Date: 2024    Visit Dx: No diagnosis found.  Patient Active Problem List   Diagnosis    Primary localized osteoarthritis of left knee    Status post left knee replacement    HTN (hypertension)    Obesity (BMI 30-39.9)     Past Medical History:   Diagnosis Date    Hypertension      Past Surgical History:   Procedure Laterality Date    KNEE ARTHROSCOPY Left     TOTAL KNEE ARTHROPLASTY Left 2024    Procedure: TOTAL KNEE ARTHROPLASTY WITH CORI ROBOT - LEFT;  Surgeon: Bhavin Tillman MD;  Location: [facility];  Service: Robotics - Ortho;  Laterality: Left;      General Information       Row Name 24 0950          Physical Therapy Time and Intention    Document Type therapy note (daily note)    initially used: [employer] ([id number]). Call unexpectedly ended. 2nd  was [employer] ([id number])  -     Mode of Treatment physical therapy  -       Row Name 24 0950          General Information    Patient Profile Reviewed yes  -LS     Existing Precautions/Restrictions --  nerve cath  -       Row Name 24 0950          Cognition    Orientation Status (Cognition) oriented x 4  -LS               User Key  (r) = Recorded By, (t) = Taken By, (c) = Cosigned By      Initials Name Provider Type    Steffanie Gamez PT Physical Therapist                   Mobility       Row Name 24 0950          Bed Mobility    Comment, (Bed Mobility) deferred-UIC and returned to chair  -       Row Name 24 0950          Sit-Stand Transfer    Sit-Stand [place] (Transfers) modified independence  -     Assistive Device (Sit-Stand Transfers) walker, front-wheeled  -       Row Name 24 0950          Gait/Stairs (Locomotion)    [place] Level (Gait) modified independence  -     Assistive Device (Gait) walker, front-wheeled  -LS     Patient was able  to Ambulate yes  -LS     Distance in Feet (Gait) 160  -LS     Comment, (Gait/Stairs) vc to avoid stepping past front of walker; pt verb understanding and then demonstrated appropriately. pt amb without KI. no LOB, therapist instructed pt to wear it if his knee feels unstable and asked if he knows how to put the brace on; he verbalized understanding and said that he does know how to jonn the KI.  -       Row Name 06/07/24 0950          Mobility    Extremity Weight-bearing Status left lower extremity  -LS     Left Lower Extremity (Weight-bearing Status) weight-bearing as tolerated (WBAT)  -               User Key  (r) = Recorded By, (t) = Taken By, (c) = Cosigned By      Initials Name Provider Type    Steffanie Gamez, ANDREA Physical Therapist                   Obj/Interventions       Row Name 06/07/24 0950          Knee (Therapeutic Exercise)    Knee Isometrics (Therapeutic Exercise) left;quad sets;10 repetitions;5 second hold  -     Knee Strengthening (Therapeutic Exercise) SLR (straight leg raise);SAQ (short arc quad);LAQ (long arc quad);10 repetitions;heel slides;left  min A with SLR and LAQ.  -       Row Name 06/07/24 0950          Ankle (Therapeutic Exercise)    Ankle (Therapeutic Exercise) AROM (active range of motion)  -       Row Name 06/07/24 0950          Balance    Balance Interventions standing;sit to stand;supported;weight shifting activity  marched in place 10 reps prior to amb to make sure L knee was stable for amb without brace  -               User Key  (r) = Recorded By, (t) = Taken By, (c) = Cosigned By      Initials Name Provider Type    Steffanie Gamez, ANDREA Physical Therapist                   Goals/Plan    No documentation.                  Clinical Impression       Row Name 06/07/24 0950          Pain    Pretreatment Pain Rating 5/10  -LS     Posttreatment Pain Rating 4/10  -LS     Pain Intervention(s) Repositioned;Ambulation/increased activity  pt declined ice pack  -LS        Row Name 06/07/24 0950          Plan of Care Review    Plan of Care Reviewed With patient;spouse  -LS     Progress improving  -LS     Outcome Evaluation Improving mobility. Able to walk without KI without any buckling or signs of instability.  -       Row Name 06/07/24 0950          Positioning and Restraints    Pre-Treatment Position sitting in chair/recliner  -LS     Post Treatment Position chair  -LS     In Chair notified nsg;sitting;call light within reach;encouraged to call for assist;with family/caregiver;legs elevated  -               User Key  (r) = Recorded By, (t) = Taken By, (c) = Cosigned By      Initials Name Provider Type    Steffanie Gamez, PT Physical Therapist                   Outcome Measures       Row Name 06/07/24 0950          How much help from another person do you currently need...    Turning from your back to your side while in flat bed without using bedrails? 4  -LS     Moving from lying on back to sitting on the side of a flat bed without bedrails? 3  -LS     Moving to and from a bed to a chair (including a wheelchair)? 4  -LS     Standing up from a chair using your arms (e.g., wheelchair, bedside chair)? 4  -LS     Climbing 3-5 steps with a railing? 3  -LS     To walk in hospital room? 4  -LS     AM-PAC 6 Clicks Score (PT) 22  -LS     Highest Level of Mobility Goal 7 --> Walk 25 feet or more  -       Row Name 06/07/24 1026 06/07/24 0950       Functional Assessment    Outcome Measure Options AM-PAC 6 Clicks Daily Activity (OT) (P)   -LR AM-PAC 6 Clicks Basic Mobility (PT)  -LS              User Key  (r) = Recorded By, (t) = Taken By, (c) = Cosigned By      Initials Name Provider Type    Steffanie Gamez, PT Physical Therapist    LR Mary Mcallister, OT Student OT Student                                 Physical Therapy Education       Title: PT OT SLP Therapies (Done)       Topic: Physical Therapy (Done)       Point: Mobility training (Done)       Learning Progress  Summary             Patient CORY Demarco VU by SC at 6/6/2024 1838    Comment: reviewed benefits of activity and use of brace                         Point: Home exercise program (Done)       Learning Progress Summary             Patient Acceptance, CORY,H, [place],DU by  at 6/7/2024 0950    Comment: HEP:aps,QS,SAQ,LAQ,SLR,heel slides to be doneX10 reps,2X/day. knee flexion stretch to be done 5 reps w/30-second holds, 2X/day. pt demonstrated appropriate technique from handout. wife demonstrated assist w/SLR and verb understandign of assist w/SAQ&LAQ    CORY Demarco VU by SC at 6/6/2024 1838    Comment: reviewed benefits of activity and use of brace                         Point: Body mechanics (Done)       Learning Progress Summary             Patient CORY Demarco VU by SC at 6/6/2024 1838    Comment: reviewed benefits of activity and use of brace                         Point: Precautions (Done)       Learning Progress Summary             Patient CORY Demarco VU by SC at 6/6/2024 1838    Comment: reviewed benefits of activity and use of brace                                         User Key       Initials Effective Dates Name Provider Type Discipline    SC 02/03/23 -  Trice Mabry, PT Physical Therapist PT     07/11/23 -  Steffanie Reno, PT Physical Therapist PT                  PT Recommendation and Plan     Plan of Care Reviewed With: patient, spouse  Progress: improving  Outcome Evaluation: Improving mobility. Able to walk without KI without any buckling or signs of instability.     Time Calculation:         PT Charges       Row Name 06/07/24 0950             Time Calculation    Start Time 0950  -LS      PT Received On 06/07/24  -      PT Goal Re-Cert Due Date 06/16/24  -         Timed Charges    71975 - PT Therapeutic Exercise Minutes 15  -LS      08796 - Gait Training Minutes  10  -LS      98094 - PT Therapeutic Activity Minutes 15  -LS         Total Minutes    Timed Charges Total Minutes 40  -LS       Total  Minutes 40  -LS                User Key  (r) = Recorded By, (t) = Taken By, (c) = Cosigned By      Initials Name Provider Type    Steffanie Gamez, PT Physical Therapist                  Therapy Charges for Today       Code Description Service Date Service Provider Modifiers Qty    57944650361  PT THER PROC EA 15 MIN 6/7/2024 Steffanie Reno, PT GP 1    05507499852 HC GAIT TRAINING EA 15 MIN 6/7/2024 Steffanie Reno, PT GP 1    [id number]  PT THERAPEUTIC ACT EA 15 MIN 6/7/2024 Steffanie Reno, PT GP 1            PT G-Codes  Outcome Measure Options: (P) AM-PAC 6 Clicks Daily Activity (OT)  AM-PAC 6 Clicks Score (PT): 22  AM-PAC 6 Clicks Score (OT): (P) 21       Steffanie Reno, ANDREA  6/7/2024

## 2024-06-07 NOTE — NURSING NOTE
"Pt alert and oriented, pain well controlled with infublock, ambulated with PT with only assist x 1/GB/W but did have slight drop in HR after walk was over to 40s.  At this time pt c/o slight pain to \"back of head\" and described it as \"pressure\".  HR increased to 60s quickly and pt stated that his head/neck did not hurt anymore and he thought it could have been where he \" was looking down while he walked with PT\"  .  NSR/Sinus [place] on tele.  Cardiac monitoring and MD to be called if pt becomes symptomatic or HR drops below 40 per Dr. AGUILAR who is aware of incident.  Ace wrap to left lower extremity CDI.  Possible d/c home in a.m. if medically ready.  Remigio Rubio RN   "

## 2024-06-07 NOTE — PLAN OF CARE
Goal Outcome Evaluation:  Plan of Care Reviewed With: (P) patient, spouse        Progress: (P) improving  Outcome Evaluation: (P) OT eval completed this date. Pt presents below baseline with increased pain, decreased activity tolerance for ADLs, mild balance deficits, and decreased distal reach. Educated pt and spouse on nerve cath precautions. Issued AE and educated on use with good teach back from pt. Standby A for doffing/donning socks with reacher/sock aid. Recommend IPOT POC and D/C home with assist when medically appropriate.      Anticipated Discharge Disposition (OT): (P) home with assist

## 2024-06-07 NOTE — PLAN OF CARE
Goal Outcome Evaluation:                                              Problem: Adult Inpatient Plan of Care  Goal: Absence of Hospital-Acquired Illness or Injury  Intervention: Identify and Manage Fall Risk  Recent Flowsheet Documentation  Taken 6/7/2024 0400 by Oliver Eckert RN  Safety Promotion/Fall Prevention:   activity supervised   safety round/check completed   toileting scheduled  Taken 6/7/2024 0200 by Oliver Eckert RN  Safety Promotion/Fall Prevention:   activity supervised   safety round/check completed   toileting scheduled  Taken 6/7/2024 0000 by Oliver Eckert RN  Safety Promotion/Fall Prevention:   activity supervised   safety round/check completed   toileting scheduled  Taken 6/6/2024 2200 by Oliver Eckert RN  Safety Promotion/Fall Prevention:   activity supervised   safety round/check completed   toileting scheduled  Taken 6/6/2024 2000 by Oliver Eckert RN  Safety Promotion/Fall Prevention:   activity supervised   safety round/check completed   toileting scheduled  Intervention: Prevent Skin Injury  Recent Flowsheet Documentation  Taken 6/7/2024 0400 by Oliver Eckert RN  Body Position: position changed independently  Taken 6/7/2024 0200 by Oliver Eckert RN  Body Position: position changed independently  Taken 6/7/2024 0000 by Oliver Eckert RN  Body Position: supine  Taken 6/6/2024 2200 by Oliver Eckert RN  Body Position: position changed independently  Taken 6/6/2024 2000 by Oliver Eckert RN  Body Position: position changed independently  Intervention: Prevent and Manage VTE (Venous Thromboembolism) Risk  Recent Flowsheet Documentation  Taken 6/7/2024 0400 by Oliver Eckert RN  VTE Prevention/Management:   bilateral   sequential compression devices on  Taken 6/7/2024 0200 by Oliver Eckert RN  VTE Prevention/Management:   bilateral   sequential compression devices on  Taken 6/7/2024 0000 by Oliver Eckert RN  VTE Prevention/Management:   bilateral   sequential  compression devices on  Taken 6/6/2024 2200 by Oliver Eckert RN  VTE Prevention/Management:   bilateral   sequential compression devices on  Taken 6/6/2024 2000 by Oliver cEkert RN  VTE Prevention/Management:   bilateral   sequential compression devices on  Intervention: Prevent Infection  Recent Flowsheet Documentation  Taken 6/7/2024 0400 by Oliver Eckert RN  Infection Prevention: environmental surveillance performed  Taken 6/7/2024 0200 by Oliver Eckert RN  Infection Prevention: environmental surveillance performed  Taken 6/7/2024 0000 by Oliver Eckert RN  Infection Prevention: environmental surveillance performed  Taken 6/6/2024 2200 by Oliver Eckert RN  Infection Prevention: environmental surveillance performed  Taken 6/6/2024 2000 by Oliver Eckert RN  Infection Prevention: environmental surveillance performed  Goal: Optimal Comfort and Wellbeing  Intervention: Monitor Pain and Promote Comfort  Recent Flowsheet Documentation  Taken 6/7/2024 0400 by Oliver Eckert RN  Pain Management Interventions: quiet environment facilitated  Taken 6/7/2024 0200 by Oliver Eckert RN  Pain Management Interventions: quiet environment facilitated  Taken 6/7/2024 0000 by Oliver Eckert RN  Pain Management Interventions: quiet environment facilitated  Taken 6/6/2024 2200 by Oliver Eckert RN  Pain Management Interventions: quiet environment facilitated  Taken 6/6/2024 2000 by Oliver Eckert RN  Pain Management Interventions: quiet environment facilitated  Intervention: Provide Person-Centered Care  Recent Flowsheet Documentation  Taken 6/7/2024 0400 by Oliver Eckert RN  Trust Relationship/Rapport: care explained  Taken 6/7/2024 0200 by Oliver Eckert RN  Trust Relationship/Rapport: care explained  Taken 6/7/2024 0000 by Oliver Eckert RN  Trust Relationship/Rapport: care explained  Taken 6/6/2024 2200 by Oliver Eckert RN  Trust Relationship/Rapport: care explained  Taken 6/6/2024 2000 by  Oliver Eckert, RN  Trust Relationship/Rapport: care explained     VSS, voids well, ambulates with assistance, pain managed with PRN medications, will continue to monitor for changes.

## 2024-06-07 NOTE — PROGRESS NOTES
"/74 (BP Location: Left arm, Patient Position: Lying)   Pulse 56   Temp 97.8 °F (36.6 °C) (Oral)   Resp 16   Ht 166.4 cm (65.5\")   Wt 89 kg (196 lb 3.4 oz)   SpO2 93%   BMI 32.15 kg/m²     Lab Results (last 24 hours)       Procedure Component Value Units Date/Time    POC Glucose Once [[id number]]  (Normal) Collected: 06/06/24 0802    Specimen: Blood Updated: 06/06/24 0805     Glucose 95 mg/dL             Imaging Results (Last 24 Hours)       Procedure Component Value Units Date/Time    XR Knee 1 or 2 View Left [[id number]] Collected: 06/06/24 1250     Updated: 06/06/24 1254    Narrative:      XR KNEE 1 OR 2 VW LEFT    Date of Exam: 6/6/2024 12:21 PM EDT    Indication: Post-Op Knee Arthoplasty    Comparison: None available.    Findings:  Total left knee arthroplasty with normal alignment. There are expected postoperative changes within the surrounding soft tissues.      Impression:      Impression:  Left knee arthroplasty with expected postoperative appearance      Electronically Signed: Kenny Edgar MD    6/6/2024 12:50 PM EDT    Workstation ID: [id number]            Patient Care Team:  Provider, No Known as PCP - General    SUBJECTIVE: He reports he is doing well.  Pain is well-controlled.  Sitting up in his chair.  Anticipates going home today.    PHYSICAL EXAM  Left knee incision is clean, dry and intact with mesh dressing in place  Thigh and calf soft and nontender  Neurovascular intact distally       Status post left knee replacement    Primary localized osteoarthritis of left knee    HTN (hypertension)    Obesity (BMI 30-39.9)      PLAN / DISPOSITION: 63-year-old male postop day #1 status post left total knee arthroplasty  -Continue to mobilize with therapy as tolerated  -Aspirin for DVT prophylaxis  -Okay to shower with mesh dressing in place once nerve catheter removed  -Plan discharge home today.  Physical therapy with [facility]  -Follow-up in 2 to 3 weeks    Bhavin Tillman MD  06/07/24  07:24 " EDT

## (undated) DEVICE — PATIENT RETURN ELECTRODE, SINGLE-USE, CONTACT QUALITY MONITORING, ADULT, WITH 9FT CORD, FOR PATIENTS WEIGING OVER 33LBS. (15KG): Brand: MEGADYNE

## (undated) DEVICE — SUT MONOCRYL PLS ANTIB UND 3/0  PS1 27IN

## (undated) DEVICE — STRYKER PERFORMANCE SERIES SAGITTAL BLADE: Brand: STRYKER PERFORMANCE SERIES

## (undated) DEVICE — BLANKT WARM UPPR/BDY ARM/OUT 57X196CM

## (undated) DEVICE — UNDERCAST PADDING: Brand: DEROYAL

## (undated) DEVICE — DRSNG PAD ABD 8X10IN STRL

## (undated) DEVICE — GLV SURG PREMIERPRO MIC LTX PF SZ8 BRN

## (undated) DEVICE — Device

## (undated) DEVICE — INTENDED FOR TISSUE SEPARATION, AND OTHER PROCEDURES THAT REQUIRE A SHARP SURGICAL BLADE TO PUNCTURE OR CUT.: Brand: BARD-PARKER ® STAINLESS STEEL BLADES

## (undated) DEVICE — TRAP FLD MINIVAC MEGADYNE 100ML

## (undated) DEVICE — SYS CLS SKIN PREMIERPRO EXOFINFUSION 22CM

## (undated) DEVICE — TRY EPID SFTY 18G 3.5IN 1T7680

## (undated) DEVICE — PK KN TOTL 10

## (undated) DEVICE — SHEET,DRAPE,53X77,STERILE: Brand: MEDLINE

## (undated) DEVICE — BNDG ELAS W/CLIP 6IN 10YD LF STRL

## (undated) DEVICE — CVR FTSWITCH UNIV

## (undated) DEVICE — ANTIBACTERIAL UNDYED BRAIDED (POLYGLACTIN 910), SYNTHETIC ABSORBABLE SUTURE: Brand: COATED VICRYL

## (undated) DEVICE — PAD,ARMBOARD,CONV,FOAM,2X8X20",12PR/CS: Brand: MEDLINE

## (undated) DEVICE — SOL PVPI 10PCT 0.75OZ PEEL/PCH/PACKET 1P/U STRL

## (undated) DEVICE — 3 BONE CEMENT MIXER: Brand: MIXEVAC

## (undated) DEVICE — NEEDLE, QUINCKE, 18GX3.5": Brand: MEDLINE

## (undated) DEVICE — PENCL SMOKE/EVAC MEGADYNE TELESCP 10FT

## (undated) DEVICE — 3M™ IOBAN™ 2 ANTIMICROBIAL INCISE DRAPE 6651EZ: Brand: IOBAN™ 2